# Patient Record
Sex: MALE | Race: WHITE | NOT HISPANIC OR LATINO | URBAN - METROPOLITAN AREA
[De-identification: names, ages, dates, MRNs, and addresses within clinical notes are randomized per-mention and may not be internally consistent; named-entity substitution may affect disease eponyms.]

---

## 2018-03-21 ENCOUNTER — OFFICE VISIT CONVERTED (OUTPATIENT)
Dept: ORTHOPEDIC SURGERY | Facility: CLINIC | Age: 58
End: 2018-03-21
Attending: PHYSICIAN ASSISTANT

## 2018-09-28 ENCOUNTER — OFFICE VISIT CONVERTED (OUTPATIENT)
Dept: FAMILY MEDICINE CLINIC | Facility: CLINIC | Age: 58
End: 2018-09-28
Attending: NURSE PRACTITIONER

## 2018-10-02 ENCOUNTER — CONVERSION ENCOUNTER (OUTPATIENT)
Dept: FAMILY MEDICINE CLINIC | Facility: CLINIC | Age: 58
End: 2018-10-02

## 2018-10-02 ENCOUNTER — OFFICE VISIT CONVERTED (OUTPATIENT)
Dept: FAMILY MEDICINE CLINIC | Facility: CLINIC | Age: 58
End: 2018-10-02
Attending: NURSE PRACTITIONER

## 2019-04-17 ENCOUNTER — OFFICE VISIT CONVERTED (OUTPATIENT)
Dept: FAMILY MEDICINE CLINIC | Facility: CLINIC | Age: 59
End: 2019-04-17
Attending: NURSE PRACTITIONER

## 2019-04-17 ENCOUNTER — HOSPITAL ENCOUNTER (OUTPATIENT)
Dept: GENERAL RADIOLOGY | Facility: HOSPITAL | Age: 59
Discharge: HOME | End: 2019-04-17
Attending: NURSE PRACTITIONER

## 2019-04-17 LAB
BASOPHILS # BLD AUTO: 0.05 10*3/UL (ref 0–0.2)
BASOPHILS NFR BLD AUTO: 0.5 % (ref 0–3)
CONV ABS IMM GRAN: 0.03 10*3/UL (ref 0–0.2)
CONV IMMATURE GRAN: 0.3 % (ref 0–1.8)
DEPRECATED RDW RBC AUTO: 40 FL (ref 35.1–43.9)
EOSINOPHIL # BLD AUTO: 0.08 10*3/UL (ref 0–0.7)
EOSINOPHIL # BLD AUTO: 0.8 % (ref 0–7)
ERYTHROCYTE [DISTWIDTH] IN BLOOD BY AUTOMATED COUNT: 11.9 % (ref 11.6–14.4)
HBA1C MFR BLD: 15.1 G/DL (ref 14–18)
HCT VFR BLD AUTO: 46.1 % (ref 42–52)
LYMPHOCYTES # BLD AUTO: 2.11 10*3/UL (ref 1–5)
MCH RBC QN AUTO: 30 PG (ref 27–31)
MCHC RBC AUTO-ENTMCNC: 32.8 G/DL (ref 33–37)
MCV RBC AUTO: 91.5 FL (ref 80–96)
MONOCYTES # BLD AUTO: 1.18 10*3/UL (ref 0.2–1.2)
MONOCYTES NFR BLD AUTO: 11.9 % (ref 3–10)
NEUTROPHILS # BLD AUTO: 6.49 10*3/UL (ref 2–8)
NEUTROPHILS NFR BLD AUTO: 65.3 % (ref 30–85)
NRBC CBCN: 0 % (ref 0–0.7)
PLATELET # BLD AUTO: 286 10*3/UL (ref 130–400)
PMV BLD AUTO: 11.1 FL (ref 9.4–12.4)
RBC # BLD AUTO: 5.04 10*6/UL (ref 4.7–6.1)
URATE SERPL-MCNC: 7 MG/DL (ref 3.5–8.5)
VARIANT LYMPHS NFR BLD MANUAL: 21.2 % (ref 20–45)
WBC # BLD AUTO: 9.94 10*3/UL (ref 4.8–10.8)

## 2019-12-23 ENCOUNTER — OFFICE VISIT CONVERTED (OUTPATIENT)
Dept: FAMILY MEDICINE CLINIC | Facility: CLINIC | Age: 59
End: 2019-12-23
Attending: NURSE PRACTITIONER

## 2019-12-23 ENCOUNTER — HOSPITAL ENCOUNTER (OUTPATIENT)
Dept: GENERAL RADIOLOGY | Facility: HOSPITAL | Age: 59
Discharge: HOME | End: 2019-12-23
Attending: NURSE PRACTITIONER

## 2020-06-29 ENCOUNTER — OFFICE VISIT CONVERTED (OUTPATIENT)
Dept: FAMILY MEDICINE CLINIC | Facility: CLINIC | Age: 60
End: 2020-06-29
Attending: FAMILY MEDICINE

## 2020-06-29 ENCOUNTER — HOSPITAL ENCOUNTER (OUTPATIENT)
Dept: GENERAL RADIOLOGY | Facility: HOSPITAL | Age: 60
Discharge: HOME | End: 2020-06-29
Attending: FAMILY MEDICINE

## 2020-06-30 LAB
ALBUMIN SERPL-MCNC: 4.4 G/DL (ref 3.5–5)
ALBUMIN/GLOB SERPL: 1.5 {RATIO} (ref 1.4–2.6)
ALP SERPL-CCNC: 5 U/L (ref 56–119)
ALT SERPL-CCNC: 42 U/L (ref 10–40)
ANION GAP SERPL CALC-SCNC: 16 MMOL/L (ref 8–19)
AST SERPL-CCNC: 34 U/L (ref 15–50)
BASOPHILS # BLD AUTO: 0.07 10*3/UL (ref 0–0.2)
BASOPHILS NFR BLD AUTO: 0.8 % (ref 0–3)
BILIRUB SERPL-MCNC: 0.39 MG/DL (ref 0.2–1.3)
BUN SERPL-MCNC: 12 MG/DL (ref 5–25)
BUN/CREAT SERPL: 13 {RATIO} (ref 6–20)
CALCIUM SERPL-MCNC: 9.6 MG/DL (ref 8.7–10.4)
CHLORIDE SERPL-SCNC: 104 MMOL/L (ref 99–111)
CHOLEST SERPL-MCNC: 147 MG/DL (ref 107–200)
CHOLEST/HDLC SERPL: 4.2 {RATIO} (ref 3–6)
CONV ABS IMM GRAN: 0.02 10*3/UL (ref 0–0.2)
CONV CO2: 24 MMOL/L (ref 22–32)
CONV IMMATURE GRAN: 0.2 % (ref 0–1.8)
CONV TOTAL PROTEIN: 7.4 G/DL (ref 6.3–8.2)
CREAT UR-MCNC: 0.96 MG/DL (ref 0.7–1.2)
DEPRECATED RDW RBC AUTO: 39.7 FL (ref 35.1–43.9)
EOSINOPHIL # BLD AUTO: 0.19 10*3/UL (ref 0–0.7)
EOSINOPHIL # BLD AUTO: 2.2 % (ref 0–7)
ERYTHROCYTE [DISTWIDTH] IN BLOOD BY AUTOMATED COUNT: 11.8 % (ref 11.6–14.4)
GFR SERPLBLD BASED ON 1.73 SQ M-ARVRAT: >60 ML/MIN/{1.73_M2}
GLOBULIN UR ELPH-MCNC: 3 G/DL (ref 2–3.5)
GLUCOSE SERPL-MCNC: 115 MG/DL (ref 70–99)
HCT VFR BLD AUTO: 45.5 % (ref 42–52)
HDLC SERPL-MCNC: 35 MG/DL (ref 40–60)
HGB BLD-MCNC: 14.9 G/DL (ref 14–18)
LDLC SERPL CALC-MCNC: 77 MG/DL (ref 70–100)
LYMPHOCYTES # BLD AUTO: 2.72 10*3/UL (ref 1–5)
LYMPHOCYTES NFR BLD AUTO: 31.4 % (ref 20–45)
MCH RBC QN AUTO: 30 PG (ref 27–31)
MCHC RBC AUTO-ENTMCNC: 32.7 G/DL (ref 33–37)
MCV RBC AUTO: 91.5 FL (ref 80–96)
MONOCYTES # BLD AUTO: 1.05 10*3/UL (ref 0.2–1.2)
MONOCYTES NFR BLD AUTO: 12.1 % (ref 3–10)
NEUTROPHILS # BLD AUTO: 4.6 10*3/UL (ref 2–8)
NEUTROPHILS NFR BLD AUTO: 53.3 % (ref 30–85)
NRBC CBCN: 0 % (ref 0–0.7)
OSMOLALITY SERPL CALC.SUM OF ELEC: 291 MOSM/KG (ref 273–304)
PLATELET # BLD AUTO: 265 10*3/UL (ref 130–400)
PMV BLD AUTO: 11 FL (ref 9.4–12.4)
POTASSIUM SERPL-SCNC: 4.3 MMOL/L (ref 3.5–5.3)
RBC # BLD AUTO: 4.97 10*6/UL (ref 4.7–6.1)
SODIUM SERPL-SCNC: 140 MMOL/L (ref 135–147)
TRIGL SERPL-MCNC: 175 MG/DL (ref 40–150)
TSH SERPL-ACNC: 5.23 M[IU]/L (ref 0.27–4.2)
VLDLC SERPL-MCNC: 35 MG/DL (ref 5–37)
WBC # BLD AUTO: 8.65 10*3/UL (ref 4.8–10.8)

## 2020-07-01 LAB
25(OH)D3 SERPL-MCNC: 25.3 NG/ML (ref 30–100)
IRON SATN MFR SERPL: 20 % (ref 20–55)
IRON SERPL-MCNC: 68 UG/DL (ref 70–180)
TIBC SERPL-MCNC: 347 UG/DL (ref 245–450)
TRANSFERRIN SERPL-MCNC: 243 MG/DL (ref 215–365)

## 2021-05-11 ENCOUNTER — CONVERSION ENCOUNTER (OUTPATIENT)
Dept: FAMILY MEDICINE CLINIC | Facility: CLINIC | Age: 61
End: 2021-05-11

## 2021-05-11 ENCOUNTER — OFFICE VISIT CONVERTED (OUTPATIENT)
Dept: FAMILY MEDICINE CLINIC | Facility: CLINIC | Age: 61
End: 2021-05-11
Attending: FAMILY MEDICINE

## 2021-05-13 NOTE — PROGRESS NOTES
Progress Note      Patient Name: Jacky Rodriguez   Patient ID: [id number]   Sex: Male   YOB: 1960    Primary Care Provider: Daniel Faust DO   Referring Provider: Daniel Faust DO    Visit Date: June 29, 2020    Provider: Daniel Faust DO   Location: [facility]   Location Address: [address] [place] [address] [address] [place] [address]  [id number]   Location Phone: [phone]          Chief Complaint  · pt here today complains of right ear pain       History Of Present Illness  Jacky Rodriguez is a 59 year old /White male who presents for evaluation and treatment of:        Patient presents complaining of ear pain wants to lose weight fatigued all the time.  No fever discharge or hearing loss from ear, has tried weight loss diets and medications in the past but no improvement fatigue is all day long wakes up and is wiped out despite doing small activities.  No chest pain palpitations headache or other       Past Medical History  Disease Name Date Onset Notes   Bilateral hip pain 03/22/2016 --    History of bilateral total hip replacement 10/06/2017 --    Hypertension --  --    Osteoarthritis (arthritis due to wear and tear of joints) 03/22/2016 --    Shortness of breath --  with exertion         Past Surgical History  Procedure Name Date Notes   Eye Implant --  yes   Hip Replacement 3-4 years ago 06/29/2020 - Bilateral hip replacement         Medication List  Name Date Started Instructions   Cortisporin ear drops  Instill 5 drops in Right Ear 3 times per day for 7 days   lisinopril-hydrochlorothiazide 20-12.5 mg oral tablet 06/16/2020 1T PO QD BP   Synthroid 50 mcg oral tablet 07/10/2020 take 1 tablet (50 mcg) by oral route once daily for 30 days   Topamax 50 mg oral tablet 06/29/2020 take 1 tablet (50 mg) by oral route daily at bedtime, can increase to 2 times daily for weight loss         Allergy List  Allergen Name Date Reaction Notes   NO KNOWN DRUG ALLERGIES --  --  --           Family Medical History  Disease Name Relative/Age Notes   Brain Neoplasm, Malignant Father/   --    Heart Disease Father/  Mother/   Father; Mother   Congestive Heart Failure Mother/   --    Asthma Mother/   --    *No Known Family History  --          Social History  Finding Status Start/Stop Quantity Notes   Active but no formal exercise --  --/-- --  --    Alcohol Use Current some day --/-- --  occasionally drinks  occasionally drinks  rarely drinks   Denies illicit substance abuse --  --/-- --  --    . --  --/-- --  --    Recreational Drug Use Never --/-- --  no   Tobacco Never --/-- --  never smoker   Uses seatbelts --  --/-- --  --    Working --  --/-- --  --          Immunizations  NameDate Admin Mfg Trade Name Lot Number Route Inj VIS Given VIS Publication   Vksowbhta95/28/2018 [facility] Fluarix, quadrivalent, preservative free [id number] [facility] 09/28/2018 08/05/2015   Comments: Patient tolerated injection well. NDC# 18890-828-79   Flcqxfvts11/29/2017 [facility] Fluarix, quadrivalent, preservative free [id number] [facility] 09/29/2017 08/07/2015   Comments: NDC#[id number]. Patient tolerated well.   Cgmhoanil62/06/2016 [facility] Fluarix, quadrivalent, preservative free [id number] [facility] 10/04/2016 10/04/2016   Comments: NDC# 21890-826-53; patient tolerated well.         Review of Systems  · Constitutional  o Admits  o : fatigue  o Denies  o : fever, weight loss, weight gain  · HENT  o Denies  o : nasal congestion, postnasal drip  · Cardiovascular  o Denies  o : lower extremity edema, claudication, chest pressure, palpitations  · Respiratory  o Denies  o : shortness of breath, wheezing, cough, hemoptysis, dyspnea on exertion  · Gastrointestinal  o Denies  o : nausea, vomiting, diarrhea, constipation, abdominal pain  · Musculoskeletal  o Denies  o : joint pain, joint swelling  · Psychiatric  o Denies  o : anxiety, depression      Vitals  Date Time BP Position Site L\R Cuff Size HR RR TEMP (F) WT  HT  BMI kg/m2 BSA m2 O2 Sat HC   "     06/29/2020 01:16 /93 Sitting    93 - R 16 98.3 243lbs 2oz 5'  10\" 34.88 2.33 96 %          Physical Examination  · Constitutional  o Appearance  o : no acute distress, well-nourished  · Head and Face  o Head  o :   § Inspection  § : atraumatic, normocephalic  · Eyes  o Eyes  o : extraocular movements intact, no scleral icterus, no conjunctival injection  · Ears, Nose, Mouth and Throat  o Ears  o :   § External Ears  § : normal  § Otoscopic Examination  § : tympanic membrane appearance within normal limits bilaterally  o Nose  o :   § Intranasal Exam  § : nares patent  o Oral Cavity  o :   § Oral Mucosa  § : moist mucous membranes  · Respiratory  o Respiratory Effort  o : breathing comfortably, symmetric chest rise  o Auscultation of Lungs  o : clear to asculatation bilaterally, no wheezes, rales, or rhonchii  · Cardiovascular  o Heart  o :   § Auscultation of Heart  § : regular rate and rhythm, no murmurs, rubs, or gallops  o Peripheral Vascular System  o :   § Extremities  § : no edema  · Neurologic  o Mental Status Examination  o :   § Orientation  § : grossly oriented to person, place and time  o Gait and Station  o :   § Gait Screening  § : normal gait  · Psychiatric  o General  o : normal mood and affect          Assessment  · Essential hypertension     401.9/I10  · Fatigue     780.79/R53.83  · GERD (gastroesophageal reflux disease)     530.81/K21.9  · Hyperlipidemia     272.4/E78.5  · Obesity     278.00/E66.9  · Otitis media     382.9/H66.90         Otitis media versus externa  *Drops to treat follow-up with no improvement    Obesity  *Managed  Recommend diet changes calories under 2000 daily  Topamax to treat follow-up monthly for weight loss    Fatigue  *Labs today to work-up  Follow-up if continues to consider depression or other    Follow-up as above for conditions in a month to manage weight loss sooner if concerns     Problems Reconciled  Plan  · Orders  o Physical, Primary Care Panel (CBC, " CMP, Lipid, TSH) [facility] ([id number], 13876, 59778, 54531) - 780.79/R53.83, 530.81/K21.9, 401.9/I10, 272.4/E78.5 - 06/29/2020  o Vitamin D (25-Hydroxy) Level (28469) - 780.79/R53.83, 530.81/K21.9, 401.9/I10, 272.4/E78.5 - 06/29/2020  o ACO-39: Current medications updated and reviewed () - - 06/29/2020  o Iron Profile (Iron, TIBC, and Transferrin) (IRONP) - 780.79/R53.83, 530.81/K21.9, 401.9/I10, 272.4/E78.5 - 06/29/2020  · Medications  o Medications have been Reconciled  o Transition of Care or Provider Policy  · Instructions  o Advised that cheeses and other sources of dairy fats, animal fats, fast food, and the extras (candy, pastries, pies, doughnuts and cookies) all contain LDL raising nutrients. Advised to increase fruits, vegetables, whole grains, and to monitor portion sizes.   o Patient was educated/instructed on their diagnosis, treatment and medications prior to discharge from the clinic today.  o Patient counseled to reduce calorie intake.  o Patient was instructed to exercise regularly.  o Patient instructed to seek medical attention urgently for new or worsening symptoms.  o Risks, benefits, and alternatives were discussed with the patient. The patient is aware of risks associated with:  o Chronic conditions reviewed and taken into consideration for today's treatment plan.  o Electronically Identified Patient Education Materials Provided Electronically  · Disposition  o Call or Return if symptoms worsen or persist.  o Return Visit Request in/on 1 month +/- 2 days ([id number]).            Electronically Signed by: Daniel Faust DO -Author on August 4, 2020 08:28:12 AM

## 2021-05-15 VITALS
HEIGHT: 70 IN | OXYGEN SATURATION: 96 % | RESPIRATION RATE: 16 BRPM | DIASTOLIC BLOOD PRESSURE: 93 MMHG | SYSTOLIC BLOOD PRESSURE: 158 MMHG | TEMPERATURE: 98.3 F | BODY MASS INDEX: 34.81 KG/M2 | HEART RATE: 93 BPM | WEIGHT: 243.12 LBS

## 2021-05-15 VITALS
SYSTOLIC BLOOD PRESSURE: 116 MMHG | HEART RATE: 90 BPM | TEMPERATURE: 97.9 F | WEIGHT: 218.5 LBS | DIASTOLIC BLOOD PRESSURE: 88 MMHG | OXYGEN SATURATION: 98 % | HEIGHT: 70 IN | BODY MASS INDEX: 31.28 KG/M2 | RESPIRATION RATE: 16 BRPM

## 2021-05-15 VITALS
WEIGHT: 233 LBS | HEIGHT: 70 IN | OXYGEN SATURATION: 99 % | DIASTOLIC BLOOD PRESSURE: 95 MMHG | RESPIRATION RATE: 20 BRPM | HEART RATE: 80 BPM | SYSTOLIC BLOOD PRESSURE: 140 MMHG | BODY MASS INDEX: 33.36 KG/M2 | TEMPERATURE: 97.2 F

## 2021-05-16 VITALS
TEMPERATURE: 97.4 F | WEIGHT: 225.25 LBS | SYSTOLIC BLOOD PRESSURE: 127 MMHG | HEART RATE: 83 BPM | HEIGHT: 70 IN | DIASTOLIC BLOOD PRESSURE: 80 MMHG | BODY MASS INDEX: 32.25 KG/M2 | RESPIRATION RATE: 20 BRPM | OXYGEN SATURATION: 96 %

## 2021-05-16 VITALS
WEIGHT: 227.56 LBS | DIASTOLIC BLOOD PRESSURE: 77 MMHG | TEMPERATURE: 97.9 F | RESPIRATION RATE: 20 BRPM | OXYGEN SATURATION: 97 % | HEIGHT: 70 IN | SYSTOLIC BLOOD PRESSURE: 117 MMHG | BODY MASS INDEX: 32.58 KG/M2 | HEART RATE: 72 BPM

## 2021-05-16 VITALS — OXYGEN SATURATION: 96 % | BODY MASS INDEX: 32.23 KG/M2 | HEART RATE: 80 BPM | HEIGHT: 70 IN | WEIGHT: 225.12 LBS

## 2021-06-05 NOTE — PROGRESS NOTES
Progress Note      Patient Name: Jacky Rodriguez   Patient ID: [id number]   Sex: Male   YOB: 1960    Primary Care Provider: Daniel Faust DO   Referring Provider: Daniel Faust DO    Visit Date: May 11, 2021    Provider: Daniel Faust DO   Location: [facility]   Location Address: [address] [place] [address]  [id number]   Location Phone: [phone]          Chief Complaint  · Yearly check up, medication refills       History Of Present Illness  Jacky Rodriguez is a 60 year old /White male who presents for evaluation and treatment of:        Patient presents to follow-up on chronic conditions namely his blood pressure HTN blood pressure today 138/91 he takes lisinopril HCTZ was last seen about a year ago been on this medication and doing well no complaints no chest inhalations headache fever shortness of breath depression anxiety SI HI or other    Counseled on doing a colonoscopy he is actually had 1 before and will get the records from that he said was in [place] review and see if one needs to be repeated       Past Medical History  Disease Name Date Onset Notes   Bilateral hip pain 03/22/2016 --    History of bilateral total hip replacement 10/06/2017 --    Hypertension --  --    Osteoarthritis (arthritis due to wear and tear of joints) 03/22/2016 --    Shortness of breath --  with exertion         Past Surgical History  Procedure Name Date Notes   Eye Implant --  yes   Hip Replacement 3-4 years ago 06/29/2020 - Bilateral hip replacement         Medication List  Name Date Started Instructions   lisinopril-hydrochlorothiazide 20-12.5 mg oral tablet 05/11/2021 take 1 tablet by oral route 2 times daily for blood presure         Allergy List  Allergen Name Date Reaction Notes   NO KNOWN DRUG ALLERGIES --  --  --        Allergies Reconciled  Family Medical History  Disease Name Relative/Age Notes   Brain Neoplasm, Malignant Father/   --    Heart Disease  "Father/  Mother/   Father; Mother   Congestive Heart Failure Mother/   --    Asthma Mother/   --    *No Known Family History  --          Social History  Finding Status Start/Stop Quantity Notes   Active but no formal exercise --  --/-- --  --    Alcohol Use Current some day --/-- --  occasionally drinks  occasionally drinks  rarely drinks   Denies illicit substance abuse --  --/-- --  --    . --  --/-- --  --    Recreational Drug Use Never --/-- --  no   Tobacco Never --/-- --  never smoker   Uses seatbelts --  --/-- --  --    Working --  --/-- --  --          Immunizations  NameDate Admin Mfg Trade Name Lot Number Route Inj VIS Given VIS Publication   Iadbazwny18/28/2018 [facility] Fluarix, quadrivalent, preservative free [id number] [facility] 09/28/2018 08/05/2015   Comments: Patient tolerated injection well. NDC# 59433-480-28         Review of Systems  · Constitutional  o * See HPI  · Eyes  o * See HPI  · HENT  o * See HPI  · Breasts  o * See HPI  · Cardiovascular  o * See HPI  · Respiratory  o * See HPI  · Gastrointestinal  o * See HPI  · Genitourinary  o * See HPI  · Integument  o * See HPI  · Neurologic  o * See HPI  · Musculoskeletal  o * See HPI  · Endocrine  o * See HPI  · Psychiatric  o * See HPI  · Heme-Lymph  o * See HPI  · Allergic-Immunologic  o * See HPI      Vitals  Date Time BP Position Site L\R Cuff Size HR RR TEMP (F) WT  HT  BMI kg/m2 BSA m2 O2 Sat FR L/min FiO2        05/11/2021 03:06 /91 Sitting    94 - R 20 97.5 245lbs 0oz 5'  10\" 35.15 2.34 96 %  21%          Physical Examination  · Constitutional  o Appearance  o : no acute distress, well-nourished  · Head and Face  o Head  o :   § Inspection  § : atraumatic, normocephalic  · Ears, Nose, Mouth and Throat  o Ears  o :   § External Ears  § : normal  o Nose  o :   § Intranasal Exam  § : nares patent  o Oral Cavity  o :   § Oral Mucosa  § : moist mucous membranes  · Respiratory  o Respiratory Effort  o : breathing comfortably, symmetric chest " rise  o Auscultation of Lungs  o : clear to asculatation bilaterally, no wheezes, rales, or rhonchii  · Cardiovascular  o Heart  o :   § Auscultation of Heart  § : regular rate and rhythm, no murmurs, rubs, or gallops  o Peripheral Vascular System  o :   § Extremities  § : no edema  · Neurologic  o Mental Status Examination  o :   § Orientation  § : grossly oriented to person, place and time  o Gait and Station  o :   § Gait Screening  § : normal gait  · Psychiatric  o General  o : normal mood and affect          Assessment  · Hypothyroidism     244.9/E03.9  · Screening for depression     V79.0/Z13.89  · Screening for colon cancer     V76.51/Z12.11  record of cscope in [place] rec getting to review  · HTN (hypertension)     401.9/I10  · HLD (hyperlipidemia)     272.4/E78.5  · Elevated glucose     790.29/R73.09       HTN  *Controlled  Continue with lisinopril HCTZ and monitor blood pressure at home  Goal less than 140/90  If runs above goal at home recommend adjust medicine or take 1-1/2 tablets daily    History of hyperlipidemia, elevated cholesterol  Elevated glucose  We will recommend repeating labs today or before follow-up   Last lipid was 2020 after glycerides were elevated and glucose was over 100 could be prediabetes repeat labs in diet modifications advised    Follow-up annually for physical labs today before follow-up and recommend getting the colonoscopy records to see if he needs to be repeated soon     Problems Reconciled  Plan  · Orders  o ACO-18: Negative screen for clinical depression using a standardized tool () - V79.0/Z13.89 - 05/11/2021  o Hgb A1c [facility] ([id number]) - 790.29/R73.09 - 05/15/2021  o Physical, Primary Care Panel (CBC, CMP, Lipid, TSH) [facility] (59063, 76166, 21172, 68125) - 244.9/E03.9, 401.9/I10, 272.4/E78.5, 790.29/R73.09 - 05/15/2021  o ACO-19: Colorectal cancer screening results documented and reviewed (3017F) - - 05/15/2021   need record from [place]  o ACO-39: Current  medications updated and reviewed (, 1159F) - 244.9/E03.9, 401.9/I10, 272.4/E78.5 - 05/11/2021  · Medications  o Medications have been Reconciled  o Transition of Care or Provider Policy  · Instructions  o Depression Screen completed and scanned into the EMR under the designated folder within the patient's documents.  o Today's PHQ-9 result is 0.  o Handouts were given to patient:   o Patient is taking medications as prescribed and doing well.   o Take all medications as prescribed/directed.  o Patient was educated/instructed on their diagnosis, treatment and medications prior to discharge from the clinic today.  o Patient instructed to seek medical attention urgently for new or worsening symptoms.  o Trusted Web sites were provided.  o Call the office with any concerns or questions.  o Bring all medicines with their bottles to each office visit.  o Risks, benefits, and alternatives were discussed with the patient. The patient is aware of risks associated with:  o Chronic conditions reviewed and taken into consideration for today's treatment plan.  o Electronically Identified Patient Education Materials Provided Electronically  · Disposition  o Call or Return if symptoms worsen or persist.  o Labs before follow up ordered  o Reviewed chart labs and imaging prior to and during encounter, updated  o Return Visit Request in/on 6 months +/- 7 days ([id number]).            Electronically Signed by: Daniel Faust DO - on May 15, 2021 07:32:39 PM

## 2021-07-15 VITALS
HEART RATE: 94 BPM | OXYGEN SATURATION: 96 % | TEMPERATURE: 97.5 F | RESPIRATION RATE: 20 BRPM | WEIGHT: 245 LBS | HEIGHT: 70 IN | DIASTOLIC BLOOD PRESSURE: 91 MMHG | BODY MASS INDEX: 35.07 KG/M2 | SYSTOLIC BLOOD PRESSURE: 138 MMHG

## 2021-08-04 ENCOUNTER — HOSPITAL ENCOUNTER (EMERGENCY)
Facility: HOSPITAL | Age: 61
Discharge: LEFT BEFORE DISCHARGE | End: 2021-08-04

## 2021-08-04 PROCEDURE — 99211 OFF/OP EST MAY X REQ PHY/QHP: CPT

## 2022-02-14 ENCOUNTER — OFFICE VISIT (OUTPATIENT)
Dept: FAMILY MEDICINE CLINIC | Facility: CLINIC | Age: 62
End: 2022-02-14

## 2022-02-14 ENCOUNTER — TELEPHONE (OUTPATIENT)
Dept: FAMILY MEDICINE CLINIC | Facility: CLINIC | Age: 62
End: 2022-02-14

## 2022-02-14 VITALS
HEART RATE: 76 BPM | OXYGEN SATURATION: 95 % | HEIGHT: 70 IN | RESPIRATION RATE: 20 BRPM | TEMPERATURE: 97.8 F | WEIGHT: 245 LBS | BODY MASS INDEX: 35.07 KG/M2 | SYSTOLIC BLOOD PRESSURE: 145 MMHG | DIASTOLIC BLOOD PRESSURE: 90 MMHG

## 2022-02-14 DIAGNOSIS — R05.9 COUGH: ICD-10-CM

## 2022-02-14 DIAGNOSIS — J06.9 UPPER RESPIRATORY TRACT INFECTION, UNSPECIFIED TYPE: Primary | ICD-10-CM

## 2022-02-14 DIAGNOSIS — Z96.653 HISTORY OF TOTAL BILATERAL KNEE REPLACEMENT: Chronic | ICD-10-CM

## 2022-02-14 PROBLEM — I10 HYPERTENSION: Status: ACTIVE | Noted: 2022-02-14

## 2022-02-14 PROBLEM — Z96.659 HISTORY OF TOTAL KNEE REPLACEMENT: Status: ACTIVE | Noted: 2017-10-06

## 2022-02-14 LAB
EXPIRATION DATE: NORMAL
FLUAV AG UPPER RESP QL IA.RAPID: NOT DETECTED
FLUBV AG UPPER RESP QL IA.RAPID: NOT DETECTED
INTERNAL CONTROL: NORMAL
Lab: NORMAL
SARS-COV-2 AG UPPER RESP QL IA.RAPID: NOT DETECTED

## 2022-02-14 PROCEDURE — 87428 SARSCOV & INF VIR A&B AG IA: CPT

## 2022-02-14 PROCEDURE — 99213 OFFICE O/P EST LOW 20 MIN: CPT

## 2022-02-14 RX ORDER — METHYLPREDNISOLONE 4 MG/1
TABLET ORAL
Qty: 1 EACH | Refills: 0 | Status: SHIPPED | OUTPATIENT
Start: 2022-02-14 | End: 2022-05-23

## 2022-02-14 RX ORDER — AZITHROMYCIN 1 G
1 PACKET (EA) ORAL ONCE
Qty: 1 PACKET | Refills: 0 | Status: SHIPPED | OUTPATIENT
Start: 2022-02-14 | End: 2022-02-14

## 2022-02-14 RX ORDER — LISINOPRIL AND HYDROCHLOROTHIAZIDE 20; 12.5 MG/1; MG/1
TABLET ORAL
COMMUNITY
Start: 2021-05-11 | End: 2022-05-20

## 2022-02-14 NOTE — TELEPHONE ENCOUNTER
Caller: [facility] - [place], [place] - [address] [place], [place] [address] DRIVE - [phone]  - [phone] FX    Relationship to patient: Pharmacy    Best call back number: [phone]    Patient is needing: FAY STATED THEY ARE OUT OF THE POWDER FORM OF AZITHROMYCIN AND REQUESTING TO DO 2 TABS TO EQUIL THE 1000 MG

## 2022-05-20 RX ORDER — LISINOPRIL AND HYDROCHLOROTHIAZIDE 20; 12.5 MG/1; MG/1
TABLET ORAL
Qty: 180 TABLET | Refills: 2 | Status: SHIPPED | OUTPATIENT
Start: 2022-05-20 | End: 2022-05-23 | Stop reason: SDUPTHER

## 2022-05-23 ENCOUNTER — LAB (OUTPATIENT)
Dept: LAB | Facility: HOSPITAL | Age: 62
End: 2022-05-23

## 2022-05-23 ENCOUNTER — OFFICE VISIT (OUTPATIENT)
Dept: FAMILY MEDICINE CLINIC | Facility: CLINIC | Age: 62
End: 2022-05-23

## 2022-05-23 VITALS
TEMPERATURE: 98.2 F | DIASTOLIC BLOOD PRESSURE: 82 MMHG | WEIGHT: 230.1 LBS | BODY MASS INDEX: 32.94 KG/M2 | RESPIRATION RATE: 18 BRPM | OXYGEN SATURATION: 97 % | SYSTOLIC BLOOD PRESSURE: 127 MMHG | HEIGHT: 70 IN | HEART RATE: 71 BPM

## 2022-05-23 DIAGNOSIS — E66.09 CLASS 1 OBESITY DUE TO EXCESS CALORIES WITH SERIOUS COMORBIDITY AND BODY MASS INDEX (BMI) OF 33.0 TO 33.9 IN ADULT: Chronic | ICD-10-CM

## 2022-05-23 DIAGNOSIS — I10 PRIMARY HYPERTENSION: Chronic | ICD-10-CM

## 2022-05-23 DIAGNOSIS — N52.2 DRUG-INDUCED ERECTILE DYSFUNCTION: ICD-10-CM

## 2022-05-23 DIAGNOSIS — Z20.822 CLOSE EXPOSURE TO COVID-19 VIRUS: ICD-10-CM

## 2022-05-23 DIAGNOSIS — I10 PRIMARY HYPERTENSION: ICD-10-CM

## 2022-05-23 DIAGNOSIS — M15.9 PRIMARY OSTEOARTHRITIS INVOLVING MULTIPLE JOINTS: ICD-10-CM

## 2022-05-23 DIAGNOSIS — M15.9 PRIMARY OSTEOARTHRITIS INVOLVING MULTIPLE JOINTS: Primary | Chronic | ICD-10-CM

## 2022-05-23 PROBLEM — R06.02 SHORTNESS OF BREATH: Status: ACTIVE | Noted: 2022-05-23

## 2022-05-23 LAB
ALBUMIN SERPL-MCNC: 4.5 G/DL (ref 3.5–5.2)
ALBUMIN/GLOB SERPL: 1.3 G/DL
ALP SERPL-CCNC: 62 U/L (ref 39–117)
ALT SERPL W P-5'-P-CCNC: 38 U/L (ref 1–41)
ANION GAP SERPL CALCULATED.3IONS-SCNC: 12.4 MMOL/L (ref 5–15)
AST SERPL-CCNC: 31 U/L (ref 1–40)
BASOPHILS # BLD AUTO: 0.08 10*3/MM3 (ref 0–0.2)
BASOPHILS NFR BLD AUTO: 1.1 % (ref 0–1.5)
BILIRUB SERPL-MCNC: 0.5 MG/DL (ref 0–1.2)
BUN SERPL-MCNC: 12 MG/DL (ref 8–23)
BUN/CREAT SERPL: 13 (ref 7–25)
CALCIUM SPEC-SCNC: 10 MG/DL (ref 8.6–10.5)
CHLORIDE SERPL-SCNC: 102 MMOL/L (ref 98–107)
CHOLEST SERPL-MCNC: 150 MG/DL (ref 0–200)
CO2 SERPL-SCNC: 23.6 MMOL/L (ref 22–29)
CREAT SERPL-MCNC: 0.92 MG/DL (ref 0.76–1.27)
DEPRECATED RDW RBC AUTO: 39.4 FL (ref 37–54)
EGFRCR SERPLBLD CKD-EPI 2021: 95.2 ML/MIN/1.73
EOSINOPHIL # BLD AUTO: 0.16 10*3/MM3 (ref 0–0.4)
EOSINOPHIL NFR BLD AUTO: 2.3 % (ref 0.3–6.2)
ERYTHROCYTE [DISTWIDTH] IN BLOOD BY AUTOMATED COUNT: 11.6 % (ref 12.3–15.4)
GLOBULIN UR ELPH-MCNC: 3.6 GM/DL
GLUCOSE SERPL-MCNC: 115 MG/DL (ref 65–99)
HCT VFR BLD AUTO: 46.6 % (ref 37.5–51)
HDLC SERPL-MCNC: 42 MG/DL (ref 40–60)
HGB BLD-MCNC: 15.7 G/DL (ref 13–17.7)
IMM GRANULOCYTES # BLD AUTO: 0.02 10*3/MM3 (ref 0–0.05)
IMM GRANULOCYTES NFR BLD AUTO: 0.3 % (ref 0–0.5)
LDLC SERPL CALC-MCNC: 93 MG/DL (ref 0–100)
LDLC/HDLC SERPL: 2.22 {RATIO}
LYMPHOCYTES # BLD AUTO: 2.95 10*3/MM3 (ref 0.7–3.1)
LYMPHOCYTES NFR BLD AUTO: 42.4 % (ref 19.6–45.3)
MCH RBC QN AUTO: 30.6 PG (ref 26.6–33)
MCHC RBC AUTO-ENTMCNC: 33.7 G/DL (ref 31.5–35.7)
MCV RBC AUTO: 90.8 FL (ref 79–97)
MONOCYTES # BLD AUTO: 0.68 10*3/MM3 (ref 0.1–0.9)
MONOCYTES NFR BLD AUTO: 9.8 % (ref 5–12)
NEUTROPHILS NFR BLD AUTO: 3.07 10*3/MM3 (ref 1.7–7)
NEUTROPHILS NFR BLD AUTO: 44.1 % (ref 42.7–76)
NRBC BLD AUTO-RTO: 0 /100 WBC (ref 0–0.2)
PLATELET # BLD AUTO: 277 10*3/MM3 (ref 140–450)
PMV BLD AUTO: 10.8 FL (ref 6–12)
POTASSIUM SERPL-SCNC: 4 MMOL/L (ref 3.5–5.2)
PROT SERPL-MCNC: 8.1 G/DL (ref 6–8.5)
RBC # BLD AUTO: 5.13 10*6/MM3 (ref 4.14–5.8)
SODIUM SERPL-SCNC: 138 MMOL/L (ref 136–145)
T4 FREE SERPL-MCNC: 1.27 NG/DL (ref 0.93–1.7)
TRIGL SERPL-MCNC: 74 MG/DL (ref 0–150)
TSH SERPL DL<=0.05 MIU/L-ACNC: 3.73 UIU/ML (ref 0.27–4.2)
VLDLC SERPL-MCNC: 15 MG/DL (ref 5–40)
WBC NRBC COR # BLD: 6.96 10*3/MM3 (ref 3.4–10.8)

## 2022-05-23 PROCEDURE — 80050 GENERAL HEALTH PANEL: CPT

## 2022-05-23 PROCEDURE — 84439 ASSAY OF FREE THYROXINE: CPT

## 2022-05-23 PROCEDURE — 80061 LIPID PANEL: CPT

## 2022-05-23 PROCEDURE — 99214 OFFICE O/P EST MOD 30 MIN: CPT | Performed by: FAMILY MEDICINE

## 2022-05-23 PROCEDURE — 86769 SARS-COV-2 COVID-19 ANTIBODY: CPT

## 2022-05-23 PROCEDURE — 36415 COLL VENOUS BLD VENIPUNCTURE: CPT

## 2022-05-23 RX ORDER — SILDENAFIL 100 MG/1
100 TABLET, FILM COATED ORAL DAILY PRN
Qty: 10 TABLET | Refills: 11 | Status: SHIPPED | OUTPATIENT
Start: 2022-05-23

## 2022-05-23 RX ORDER — LISINOPRIL AND HYDROCHLOROTHIAZIDE 20; 12.5 MG/1; MG/1
1 TABLET ORAL 2 TIMES DAILY
Qty: 180 TABLET | Refills: 2 | Status: SHIPPED | OUTPATIENT
Start: 2022-05-23

## 2022-05-26 LAB — SARS-COV-2 AB SERPL QL IA: NEGATIVE

## 2022-06-03 PROBLEM — I10 HYPERTENSION: Chronic | Status: ACTIVE | Noted: 2022-02-14

## 2022-06-03 PROBLEM — E66.811 CLASS 1 OBESITY DUE TO EXCESS CALORIES WITH SERIOUS COMORBIDITY AND BODY MASS INDEX (BMI) OF 33.0 TO 33.9 IN ADULT: Status: ACTIVE | Noted: 2022-06-03

## 2022-06-03 PROBLEM — E66.09 CLASS 1 OBESITY DUE TO EXCESS CALORIES WITH SERIOUS COMORBIDITY AND BODY MASS INDEX (BMI) OF 33.0 TO 33.9 IN ADULT: Status: ACTIVE | Noted: 2022-06-03

## 2023-03-02 ENCOUNTER — OFFICE VISIT (OUTPATIENT)
Dept: FAMILY MEDICINE CLINIC | Facility: CLINIC | Age: 63
End: 2023-03-02
Payer: COMMERCIAL

## 2023-03-02 VITALS
SYSTOLIC BLOOD PRESSURE: 118 MMHG | TEMPERATURE: 98 F | HEART RATE: 109 BPM | DIASTOLIC BLOOD PRESSURE: 73 MMHG | HEIGHT: 70 IN | RESPIRATION RATE: 20 BRPM | WEIGHT: 224.6 LBS | OXYGEN SATURATION: 98 % | BODY MASS INDEX: 32.16 KG/M2

## 2023-03-02 DIAGNOSIS — H10.33 ACUTE CONJUNCTIVITIS OF BOTH EYES, UNSPECIFIED ACUTE CONJUNCTIVITIS TYPE: ICD-10-CM

## 2023-03-02 DIAGNOSIS — J06.9 UPPER RESPIRATORY TRACT INFECTION, UNSPECIFIED TYPE: Primary | ICD-10-CM

## 2023-03-02 DIAGNOSIS — R05.1 ACUTE COUGH: ICD-10-CM

## 2023-03-02 DIAGNOSIS — R09.81 NASAL CONGESTION: ICD-10-CM

## 2023-03-02 DIAGNOSIS — J02.9 SORE THROAT: ICD-10-CM

## 2023-03-02 LAB
EXPIRATION DATE: NORMAL
FLUAV AG NPH QL: NEGATIVE
FLUBV AG NPH QL: NEGATIVE
INTERNAL CONTROL: NORMAL
Lab: NORMAL
S PYO AG THROAT QL: NEGATIVE
SARS-COV-2 AG UPPER RESP QL IA.RAPID: NOT DETECTED

## 2023-03-02 PROCEDURE — 99214 OFFICE O/P EST MOD 30 MIN: CPT

## 2023-03-02 PROCEDURE — 87804 INFLUENZA ASSAY W/OPTIC: CPT

## 2023-03-02 PROCEDURE — 87426 SARSCOV CORONAVIRUS AG IA: CPT

## 2023-03-02 PROCEDURE — 87880 STREP A ASSAY W/OPTIC: CPT

## 2023-03-02 RX ORDER — POLYMYXIN B SULFATE AND TRIMETHOPRIM 1; 10000 MG/ML; [USP'U]/ML
1 SOLUTION OPHTHALMIC EVERY 4 HOURS
Qty: 10 ML | Refills: 0 | Status: SHIPPED | OUTPATIENT
Start: 2023-03-02 | End: 2023-03-09

## 2023-03-02 RX ORDER — CEFDINIR 300 MG/1
300 CAPSULE ORAL 2 TIMES DAILY
Qty: 14 CAPSULE | Refills: 0 | Status: SHIPPED | OUTPATIENT
Start: 2023-03-02 | End: 2023-03-09

## 2023-03-02 RX ORDER — BROMPHENIRAMINE MALEATE, PSEUDOEPHEDRINE HYDROCHLORIDE, AND DEXTROMETHORPHAN HYDROBROMIDE 2; 30; 10 MG/5ML; MG/5ML; MG/5ML
5 SYRUP ORAL 4 TIMES DAILY PRN
Qty: 118 ML | Refills: 0 | Status: SHIPPED | OUTPATIENT
Start: 2023-03-02

## 2023-03-02 NOTE — PROGRESS NOTES
"Chief Complaint   Patient presents with   • Sore Throat     Symptoms for three days now.    • Cough     Coughing up  a lot of drainage.    • Nasal Congestion   • Eye Drainage     Has irritated and getting crusty, grandson had pink eye.        Subjective          Jacky Rodriguez presents to [facility]    History of Present Illness  He is here to be seen for sore throat, cough, nasal congestion, and eye drainage. He has had these symptoms for 3 days now. The eye drainage started yesterday. His grandson did have pink eye and he believes he could have gotten it from him. He was tested for strep, covid, and flu and was negative for all three.       Past History:  Medical History: has no past medical history on file.   Surgical History: has no past surgical history on file.   Family History: family history is not on file.   Social History: reports that he has never smoked. He has never been exposed to tobacco smoke. He has never used smokeless tobacco. He reports that he does not drink alcohol and does not use drugs.  Allergies: Patient has no known allergies.  (Not in a hospital admission)       Social History     Socioeconomic History   • Marital status:    Tobacco Use   • Smoking status: Never     Passive exposure: Never   • Smokeless tobacco: Never   Substance and Sexual Activity   • Alcohol use: Never   • Drug use: Never       There are no preventive care reminders to display for this patient.    Objective     Vital Signs:   /73 (BP Location: Left arm, Patient Position: Sitting)   Pulse 109   Temp 98 °F (36.7 °C) (Infrared)   Resp 20   Ht 177.8 cm (70\")   Wt 102 kg (224 lb 9.6 oz)   SpO2 98%   BMI 32.23 kg/m²       Physical Exam  Constitutional:       Appearance: Normal appearance.   HENT:      Nose: Nose normal.      Mouth/Throat:      Mouth: Mucous membranes are moist.   Eyes:      General:         Right eye: Discharge present.         Left eye: Discharge " present.  Cardiovascular:      Rate and Rhythm: Normal rate and regular rhythm.      Pulses: Normal pulses.      Heart sounds: Normal heart sounds.   Pulmonary:      Effort: Pulmonary effort is normal.      Breath sounds: Normal breath sounds.   Skin:     General: Skin is warm and dry.   Neurological:      General: No focal deficit present.      Mental Status: He is alert and oriented to person, place, and time.   Psychiatric:         Mood and Affect: Mood normal.         Behavior: Behavior normal.          Review of Systems   HENT: Positive for congestion.    Respiratory: Positive for cough.    All other systems reviewed and are negative.       Result Review :                 Assessment and Plan    Diagnoses and all orders for this visit:    1. Upper respiratory tract infection, unspecified type (Primary)  -     cefdinir (OMNICEF) 300 MG capsule; Take 1 capsule by mouth 2 (Two) Times a Day for 7 days.  Dispense: 14 capsule; Refill: 0    2. Acute conjunctivitis of both eyes, unspecified acute conjunctivitis type  -     trimethoprim-polymyxin b (POLYTRIM) 43183-6.1 UNIT/ML-% ophthalmic solution; Administer 1 drop to both eyes Every 4 (Four) Hours for 7 days.  Dispense: 10 mL; Refill: 0    3. Nasal congestion  -     POCT SARS-CoV-2 Antigen ERI  -     POCT Influenza A/B    4. Acute cough  -     POCT SARS-CoV-2 Antigen ERI  -     POCT Influenza A/B  -     brompheniramine-pseudoephedrine-DM 30-2-10 MG/5ML syrup; Take 5 mL by mouth 4 (Four) Times a Day As Needed for Cough or Allergies.  Dispense: 118 mL; Refill: 0    5. Sore throat  -     POCT rapid strep A      I spent 35 minutes caring for Jacky on this date of service. This time includes time spent by me in the following activities:preparing for the visit, reviewing tests, obtaining and/or reviewing a separately obtained history, performing a medically appropriate examination and/or evaluation , counseling and educating the patient/family/caregiver, ordering  medications, tests, or procedures and documenting information in the medical record    Pt thought to be clinically stable at this time.    Follow Up   No follow-ups on file.  Patient was given instructions and counseling regarding his condition or for health maintenance advice. Please see specific information pulled into the AVS if appropriate.

## 2023-03-06 ENCOUNTER — TELEPHONE (OUTPATIENT)
Dept: FAMILY MEDICINE CLINIC | Facility: CLINIC | Age: 63
End: 2023-03-06
Payer: COMMERCIAL

## 2023-03-07 RX ORDER — PREDNISONE 20 MG/1
TABLET ORAL
Qty: 18 TABLET | Refills: 0 | Status: SHIPPED | OUTPATIENT
Start: 2023-03-07 | End: 2023-03-16

## 2023-03-07 NOTE — TELEPHONE ENCOUNTER
Spoke with patient, swelling in hand only, was feeling hot when it first started but now just swollen and painful. Patient stated started on Friday.    Per Dr. Faust, send in taper dose of prednisone,60mg 3days, 40mg 3 days, 20mg 3 days.    Let patient know medication was being sent, if he did not notice a change after 2 days to call office back

## 2023-07-28 ENCOUNTER — TELEPHONE (OUTPATIENT)
Dept: FAMILY MEDICINE CLINIC | Facility: CLINIC | Age: 63
End: 2023-07-28
Payer: COMMERCIAL

## 2023-10-03 ENCOUNTER — OFFICE VISIT (OUTPATIENT)
Dept: FAMILY MEDICINE CLINIC | Facility: CLINIC | Age: 63
End: 2023-10-03
Payer: COMMERCIAL

## 2023-10-03 VITALS
SYSTOLIC BLOOD PRESSURE: 150 MMHG | HEIGHT: 70 IN | OXYGEN SATURATION: 97 % | BODY MASS INDEX: 32.48 KG/M2 | DIASTOLIC BLOOD PRESSURE: 88 MMHG | HEART RATE: 101 BPM | RESPIRATION RATE: 16 BRPM | TEMPERATURE: 98.7 F | WEIGHT: 226.9 LBS

## 2023-10-03 DIAGNOSIS — I10 HYPERTENSION, UNSPECIFIED TYPE: Primary | ICD-10-CM

## 2023-10-03 DIAGNOSIS — R00.0 TACHYCARDIA: ICD-10-CM

## 2023-10-03 PROCEDURE — 99213 OFFICE O/P EST LOW 20 MIN: CPT | Performed by: FAMILY MEDICINE

## 2023-10-03 RX ORDER — METOPROLOL SUCCINATE 25 MG/1
25 TABLET, EXTENDED RELEASE ORAL DAILY
Qty: 30 TABLET | Refills: 2 | Status: SHIPPED | OUTPATIENT
Start: 2023-10-03

## 2023-10-03 NOTE — PROGRESS NOTES
"Chief Complaint  Syncope and Shortness of Breath    Subjective        Jacky Rodriguez presents to [facility]  History of Present Illness    Patient presents with syncopal type episode shortness of breath patient says he has been working in the cold and recently has episodes where he feels like he is overheating perhaps blood pressure medicine he takes daily has been well controlled mildly elevated today and heart rate elevated shortness of breath improved with deep breathing    symptoms get worse since receiving RSV vaccine    Objective   Vital Signs:  /88   Pulse 101   Temp 98.7 °F (37.1 °C)   Resp 16   Ht 177.8 cm (70\")   Wt 103 kg (226 lb 14.4 oz)   SpO2 97%   BMI 32.56 kg/m²   Estimated body mass index is 32.56 kg/m² as calculated from the following:    Height as of this encounter: 177.8 cm (70\").    Weight as of this encounter: 103 kg (226 lb 14.4 oz).               Physical Exam  Vitals reviewed.   Constitutional:       Appearance: Normal appearance. He is well-developed.   HENT:      Head: Normocephalic and atraumatic.      Right Ear: External ear normal.      Left Ear: External ear normal.      Nose: Nose normal.   Eyes:      Conjunctiva/sclera: Conjunctivae normal.      Pupils: Pupils are equal, round, and reactive to light.   Cardiovascular:      Rate and Rhythm: Normal rate.   Pulmonary:      Effort: Pulmonary effort is normal.      Breath sounds: Normal breath sounds.   Abdominal:      General: There is no distension.   Skin:     General: Skin is warm and dry.   Neurological:      Mental Status: He is alert and oriented to person, place, and time. Mental status is at baseline.   Psychiatric:         Mood and Affect: Mood and affect normal.         Behavior: Behavior normal.         Thought Content: Thought content normal.         Judgment: Judgment normal.      Result Review :  The following data was reviewed by: Daniel Faust DO on 10/03/2023:  Common labs  "         7/19/2023    08:06   Common Labs   Glucose 110    BUN 12    Creatinine 1.02    Sodium 140    Potassium 4.0    Chloride 102    Calcium 9.8    Albumin 4.3    Total Bilirubin 0.4    Alkaline Phosphatase 66    AST (SGOT) 35    ALT (SGPT) 33    WBC 8.77    Hemoglobin 15.1    Hematocrit 44.1    Platelets 352    Total Cholesterol 141    Triglycerides 56    HDL Cholesterol 36    LDL Cholesterol  93    PSA 1.710                   Assessment and Plan   Diagnoses and all orders for this visit:    1. Hypertension, unspecified type (Primary)    2. Tachycardia    Other orders  -     metoprolol succinate XL (Toprol XL) 25 MG 24 hr tablet; Take 1 tablet by mouth Daily.  Dispense: 30 tablet; Refill: 2      Blood pressure elevated slightly today tachycardic recommended continuing blood pressure medicine starting metoprolol monitor blood pressure at home goal less than 140/90 following up in a couple weeks and consider further work-up imaging if needed if symptoms continue or worsen         Follow Up   Return in about 2 weeks (around 10/17/2023) for Recheck.  Patient was given instructions and counseling regarding his condition or for health maintenance advice. Please see specific information pulled into the AVS if appropriate.

## 2023-10-09 ENCOUNTER — APPOINTMENT (OUTPATIENT)
Dept: CT IMAGING | Facility: HOSPITAL | Age: 63
End: 2023-10-09
Payer: COMMERCIAL

## 2023-10-09 ENCOUNTER — HOSPITAL ENCOUNTER (EMERGENCY)
Facility: HOSPITAL | Age: 63
Discharge: HOME | End: 2023-10-09
Attending: EMERGENCY MEDICINE | Admitting: EMERGENCY MEDICINE
Payer: COMMERCIAL

## 2023-10-09 VITALS
HEART RATE: 90 BPM | RESPIRATION RATE: 16 BRPM | SYSTOLIC BLOOD PRESSURE: 121 MMHG | BODY MASS INDEX: 33.47 KG/M2 | TEMPERATURE: 98.1 F | HEIGHT: 69 IN | DIASTOLIC BLOOD PRESSURE: 88 MMHG | OXYGEN SATURATION: 94 % | WEIGHT: 225.97 LBS

## 2023-10-09 DIAGNOSIS — S06.0X1A CONCUSSION WITH LOSS OF CONSCIOUSNESS OF 30 MINUTES OR LESS, INITIAL ENCOUNTER: Primary | ICD-10-CM

## 2023-10-09 LAB
ALBUMIN SERPL-MCNC: 4.3 G/DL (ref 3.5–5.2)
ALBUMIN/GLOB SERPL: 1 G/DL
ALP SERPL-CCNC: 72 U/L (ref 39–117)
ALT SERPL W P-5'-P-CCNC: 26 U/L (ref 1–41)
ANION GAP SERPL CALCULATED.3IONS-SCNC: 11.5 MMOL/L (ref 5–15)
AST SERPL-CCNC: 28 U/L (ref 1–40)
BASOPHILS # BLD AUTO: 0.1 10*3/MM3 (ref 0–0.2)
BASOPHILS NFR BLD AUTO: 0.8 % (ref 0–1.5)
BILIRUB SERPL-MCNC: 0.5 MG/DL (ref 0–1.2)
BUN SERPL-MCNC: 19 MG/DL (ref 8–23)
BUN/CREAT SERPL: 17.4 (ref 7–25)
CALCIUM SPEC-SCNC: 10.1 MG/DL (ref 8.6–10.5)
CHLORIDE SERPL-SCNC: 96 MMOL/L (ref 98–107)
CO2 SERPL-SCNC: 28.5 MMOL/L (ref 22–29)
CREAT SERPL-MCNC: 1.09 MG/DL (ref 0.76–1.27)
DEPRECATED RDW RBC AUTO: 40.5 FL (ref 37–54)
EGFRCR SERPLBLD CKD-EPI 2021: 77.2 ML/MIN/1.73
EOSINOPHIL # BLD AUTO: 0.27 10*3/MM3 (ref 0–0.4)
EOSINOPHIL NFR BLD AUTO: 2.3 % (ref 0.3–6.2)
ERYTHROCYTE [DISTWIDTH] IN BLOOD BY AUTOMATED COUNT: 12 % (ref 12.3–15.4)
GLOBULIN UR ELPH-MCNC: 4.1 GM/DL
GLUCOSE SERPL-MCNC: 101 MG/DL (ref 65–99)
HCT VFR BLD AUTO: 49.1 % (ref 37.5–51)
HGB BLD-MCNC: 16.2 G/DL (ref 13–17.7)
HOLD SPECIMEN: NORMAL
HOLD SPECIMEN: NORMAL
IMM GRANULOCYTES # BLD AUTO: 0.05 10*3/MM3 (ref 0–0.05)
IMM GRANULOCYTES NFR BLD AUTO: 0.4 % (ref 0–0.5)
LYMPHOCYTES # BLD AUTO: 3.82 10*3/MM3 (ref 0.7–3.1)
LYMPHOCYTES NFR BLD AUTO: 32.4 % (ref 19.6–45.3)
MAGNESIUM SERPL-MCNC: 2.4 MG/DL (ref 1.6–2.4)
MCH RBC QN AUTO: 30.1 PG (ref 26.6–33)
MCHC RBC AUTO-ENTMCNC: 33 G/DL (ref 31.5–35.7)
MCV RBC AUTO: 91.1 FL (ref 79–97)
MONOCYTES # BLD AUTO: 1.21 10*3/MM3 (ref 0.1–0.9)
MONOCYTES NFR BLD AUTO: 10.3 % (ref 5–12)
NEUTROPHILS NFR BLD AUTO: 53.8 % (ref 42.7–76)
NEUTROPHILS NFR BLD AUTO: 6.33 10*3/MM3 (ref 1.7–7)
NRBC BLD AUTO-RTO: 0 /100 WBC (ref 0–0.2)
PLATELET # BLD AUTO: 332 10*3/MM3 (ref 140–450)
PMV BLD AUTO: 10.4 FL (ref 6–12)
POTASSIUM SERPL-SCNC: 4.1 MMOL/L (ref 3.5–5.2)
PROT SERPL-MCNC: 8.4 G/DL (ref 6–8.5)
RBC # BLD AUTO: 5.39 10*6/MM3 (ref 4.14–5.8)
SODIUM SERPL-SCNC: 136 MMOL/L (ref 136–145)
TROPONIN T SERPL HS-MCNC: 10 NG/L
WBC NRBC COR # BLD: 11.78 10*3/MM3 (ref 3.4–10.8)
WHOLE BLOOD HOLD COAG: NORMAL
WHOLE BLOOD HOLD SPECIMEN: NORMAL

## 2023-10-09 PROCEDURE — 84484 ASSAY OF TROPONIN QUANT: CPT | Performed by: EMERGENCY MEDICINE

## 2023-10-09 PROCEDURE — 93005 ELECTROCARDIOGRAM TRACING: CPT

## 2023-10-09 PROCEDURE — 80053 COMPREHEN METABOLIC PANEL: CPT | Performed by: EMERGENCY MEDICINE

## 2023-10-09 PROCEDURE — 85025 COMPLETE CBC W/AUTO DIFF WBC: CPT | Performed by: EMERGENCY MEDICINE

## 2023-10-09 PROCEDURE — 99284 EMERGENCY DEPT VISIT MOD MDM: CPT

## 2023-10-09 PROCEDURE — 70450 CT HEAD/BRAIN W/O DYE: CPT

## 2023-10-09 PROCEDURE — 93005 ELECTROCARDIOGRAM TRACING: CPT | Performed by: EMERGENCY MEDICINE

## 2023-10-09 PROCEDURE — 83735 ASSAY OF MAGNESIUM: CPT | Performed by: EMERGENCY MEDICINE

## 2023-10-09 RX ORDER — SODIUM CHLORIDE 0.9 % (FLUSH) 0.9 %
10 SYRINGE (ML) INJECTION AS NEEDED
Status: DISCONTINUED | OUTPATIENT
Start: 2023-10-09 | End: 2023-10-09 | Stop reason: HOSPADM

## 2023-10-09 NOTE — Clinical Note
[facility] EMERGENCY ROOM  [address]  [place] [address]  Phone: [phone]    Jacky Rodriguez was seen and treated in our emergency department on 10/9/2023.  He may return to work on 10/16/2023.         Thank you for choosing [facility].    Rubens Casillas MD

## 2023-10-09 NOTE — ED PROVIDER NOTES
Time: 5:00 PM EDT  Date of encounter:  10/9/2023  Independent Historian/Clinical History and Information was obtained by:   Patient and Family    History is limited by: N/A    Chief Complaint   Patient presents with    Headache    Syncope         History of Present Illness:  Patient is a 62 y.o. year old male who presents to the emergency department for evaluation of headache and syncope.  Patient's family states the patient had RSV shot approximately 1 week ago and had a syncopal episode on 10-2-2023.  Patient states since that time he has had severe headache with intermittent dizziness.  Patient has also been confused and forgetful since that time.  (Provider in triage, Kendell Berger PA-C)    Patient Care Team  Primary Care Provider: Daniel Faust DO    Past Medical History:     Allergies   Allergen Reactions    Cefdinir Swelling     History reviewed. No pertinent past medical history.  Past Surgical History:   Procedure Laterality Date    PARTIAL HIP ARTHROPLASTY Bilateral      History reviewed. No pertinent family history.    Home Medications:  Prior to Admission medications    Medication Sig Start Date End Date Taking? Authorizing Provider   lisinopril-hydrochlorothiazide (PRINZIDE,ZESTORETIC) 20-12.5 MG per tablet Take 1 tablet by mouth Daily. 7/19/23   Daniel Faust DO   metoprolol succinate XL (Toprol XL) 25 MG 24 hr tablet Take 1 tablet by mouth Daily. 10/3/23   Daniel Faust DO   sildenafil (Viagra) 100 MG tablet Take 1 tablet by mouth Daily As Needed for Erectile Dysfunction. 7/19/23   Daniel Faust DO        Social History:   Social History     Tobacco Use    Smoking status: Never     Passive exposure: Never    Smokeless tobacco: Never   Vaping Use    Vaping Use: Never used   Substance Use Topics    Alcohol use: Not Currently     Comment: occ    Drug use: Never         Review of Systems:  Review of Systems   Constitutional:  Negative for chills and fever.   HENT:  Negative for congestion, rhinorrhea  "and sore throat.    Eyes:  Negative for pain and visual disturbance.   Respiratory:  Negative for apnea, cough, chest tightness and shortness of breath.    Cardiovascular:  Negative for chest pain and palpitations.   Gastrointestinal:  Negative for abdominal pain, diarrhea, nausea and vomiting.   Genitourinary:  Negative for difficulty urinating and dysuria.   Musculoskeletal:  Negative for joint swelling and myalgias.   Skin:  Negative for color change.   Neurological:  Positive for dizziness, syncope and headaches. Negative for seizures.   Psychiatric/Behavioral: Negative.     All other systems reviewed and are negative.       Physical Exam:  /88   Pulse 90   Temp 98.1 øF (36.7 øC) (Oral)   Resp 16   Ht 175.3 cm (69\")   Wt 102 kg (225 lb 15.5 oz)   SpO2 94%   BMI 33.37 kg/mý         Physical Exam  Vitals and nursing note reviewed.   Constitutional:       General: He is not in acute distress.     Appearance: Normal appearance. He is not toxic-appearing.   HENT:      Head: Normocephalic and atraumatic.      Jaw: There is normal jaw occlusion.   Eyes:      General: Lids are normal.      Extraocular Movements: Extraocular movements intact.      Conjunctiva/sclera: Conjunctivae normal.      Pupils: Pupils are equal, round, and reactive to light.      Comments: No nystagmus   Cardiovascular:      Rate and Rhythm: Normal rate and regular rhythm.      Pulses: Normal pulses.      Heart sounds: Normal heart sounds.   Pulmonary:      Effort: Pulmonary effort is normal. No respiratory distress.      Breath sounds: Normal breath sounds. No wheezing or rhonchi.   Abdominal:      General: Abdomen is flat. There is no distension.      Palpations: Abdomen is soft.      Tenderness: There is no abdominal tenderness. There is no guarding or rebound.   Musculoskeletal:         General: Normal range of motion.      Cervical back: Normal range of motion and neck supple.      Right lower leg: No edema.      Left lower leg: " No edema.   Skin:     General: Skin is warm and dry.      Coloration: Skin is not cyanotic.   Neurological:      Mental Status: He is alert and oriented to person, place, and time. Mental status is at baseline.   Psychiatric:         Attention and Perception: Attention and perception normal.         Mood and Affect: Mood normal.                      Procedures:  Procedures      Medical Decision Making:      Comorbidities that affect care:    Hypertension    External Notes reviewed:    Previous Clinic Note: Family medicine office visit for general medical management      The following orders were placed and all results were independently analyzed by me:  Orders Placed This Encounter   Procedures    CT Head Without Contrast    [place] Draw    Comprehensive Metabolic Panel    Magnesium    Single High Sensitivity Troponin T    CBC Auto Differential    NPO Diet NPO Type: Strict NPO    Undress & Gown    Continuous Pulse Oximetry    Vital Signs    Orthostatic Blood Pressure    Oxygen Therapy- Nasal Cannula; Titrate 1-6 LPM Per SpO2; 90 - 95%    POC Glucose Once    ECG 12 Lead ED Triage Standing Order; Syncope    Insert Peripheral IV    CBC & Differential    Green Top (Gel)    Lavender Top    Gold Top - SST    Light Blue Top       Medications Given in the Emergency Department:  Medications   sodium chloride 0.9 % flush 10 mL (has no administration in time range)        ED Course:    The patient was initially evaluated in the triage area where orders were placed. The patient was later dispositioned by Rubens Casillas MD.      The patient was advised to stay for completion of workup which includes but is not limited to communication of labs and radiological results, reassessment and plan. The patient was advised that leaving prior to disposition by a provider could result in critical findings that are not communicated to the patient.     ED Course as of 10/09/23 1958   Mon Oct 09, 2023   1701 PROVIDER IN TRIAGE  Patient was  evaluated by me in triage, Kendell Berger PA-C.  Orders were placed and patient is currently awaiting final results and disposition.  [MD]      ED Course User Index  [MD] Kendell Berger PA-C       Labs:    Lab Results (last 24 hours)       Procedure Component Value Units Date/Time    CBC & Differential [[id number]]  (Abnormal) Collected: 10/09/23 1848    Specimen: Blood Updated: 10/09/23 1856    Narrative:      The following orders were created for panel order CBC & Differential.  Procedure                               Abnormality         Status                     ---------                               -----------         ------                     CBC Auto Differential[[id number]]        Abnormal            Final result                 Please view results for these tests on the individual orders.    Comprehensive Metabolic Panel [[id number]]  (Abnormal) Collected: 10/09/23 1848    Specimen: Blood Updated: 10/09/23 1914     Glucose 101 mg/dL      BUN 19 mg/dL      Creatinine 1.09 mg/dL      Sodium 136 mmol/L      Potassium 4.1 mmol/L      Chloride 96 mmol/L      CO2 28.5 mmol/L      Calcium 10.1 mg/dL      Total Protein 8.4 g/dL      Albumin 4.3 g/dL      ALT (SGPT) 26 U/L      AST (SGOT) 28 U/L      Alkaline Phosphatase 72 U/L      Total Bilirubin 0.5 mg/dL      Globulin 4.1 gm/dL      A/G Ratio 1.0 g/dL      BUN/Creatinine Ratio 17.4     Anion Gap 11.5 mmol/L      eGFR 77.2 mL/min/1.73     Narrative:      GFR Normal >60  Chronic Kidney Disease <60  Kidney Failure <15      Magnesium [[id number]]  (Normal) Collected: 10/09/23 1848    Specimen: Blood Updated: 10/09/23 1914     Magnesium 2.4 mg/dL     Single High Sensitivity Troponin T [[id number]]  (Normal) Collected: 10/09/23 1848    Specimen: Blood Updated: 10/09/23 1914     HS Troponin T 10 ng/L     Narrative:      High Sensitive Troponin T Reference Range:  <10.0 ng/L- Negative Female for AMI  <15.0 ng/L- Negative Male for AMI  >=10 - Abnormal Female  indicating possible myocardial injury.  >=15 - Abnormal Male indicating possible myocardial injury.   Clinicians would have to utilize clinical acumen, EKG, Troponin, and serial changes to determine if it is an Acute Myocardial Infarction or myocardial injury due to an underlying chronic condition.         CBC Auto Differential [[id number]]  (Abnormal) Collected: 10/09/23 1848    Specimen: Blood Updated: 10/09/23 1856     WBC 11.78 10*3/mm3      RBC 5.39 10*6/mm3      Hemoglobin 16.2 g/dL      Hematocrit 49.1 %      MCV 91.1 fL      MCH 30.1 pg      MCHC 33.0 g/dL      RDW 12.0 %      RDW-SD 40.5 fl      MPV 10.4 fL      Platelets 332 10*3/mm3      Neutrophil % 53.8 %      Lymphocyte % 32.4 %      Monocyte % 10.3 %      Eosinophil % 2.3 %      Basophil % 0.8 %      Immature Grans % 0.4 %      Neutrophils, Absolute 6.33 10*3/mm3      Lymphocytes, Absolute 3.82 10*3/mm3      Monocytes, Absolute 1.21 10*3/mm3      Eosinophils, Absolute 0.27 10*3/mm3      Basophils, Absolute 0.10 10*3/mm3      Immature Grans, Absolute 0.05 10*3/mm3      nRBC 0.0 /100 WBC              Imaging:    CT Head Without Contrast    Result Date: 10/9/2023  PROCEDURE: CT HEAD WO CONTRAST  COMPARISON:  None INDICATIONS: Dizziness, syncope with head injury 3 days ago  PROTOCOL:   Standard imaging protocol performed    RADIATION:   DLP: 1082.2mGy*cm   MA and/or KV was adjusted to minimize radiation dose.     TECHNIQUE: After obtaining the patient's consent, CT images were obtained without non-ionic intravenous contrast material.  FINDINGS:  No focal brain lesion, intracranial mass or intracranial hemorrhage is seen.  The ventricles are normal in size and configuration.  In the left temporal region is a 1.9 cm fat containing intramuscular mass consistent with a lipoma.  Moderate mucosal inflammatory changes are noted in the ethmoid sinuses bilaterally.        1. No acute fracture or intracranial hemorrhage 2. 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     Jose Torres M.D.       Electronically Signed and Approved By: Jose Torres M.D. on 10/09/2023 at 18:53                Differential Diagnosis and Discussion:      Hematuria: Differential diagnosis includes but is not limited to medications, coagulopathy, glomerulonephritis, nephritis, neoplasm, vascular abnormalities, cystitis, urethritis, neoplasms of the bladder, and autoimmune disorders.  Syncope: Differential diagnosis includes but is not limited to TIA, hyperventilation, aortic stenosis, pulmonary emboli, myocardial disease, bradycardia arrhythmia, heart block, tachyarrhythmia, vasovagal, orthostatic hypotension, ruptured AAA, aortic dissection, subarachnoid hemorrhage, seizure, hypoglycemia.    All labs were reviewed and interpreted by me.  CT scan radiology impression was interpreted by me.    MDM  Number of Diagnoses or Management Options  Concussion with loss of consciousness of 30 minutes or less, initial encounter  Diagnosis management comments: In summary this is a 62-year-old male patient who presents to the emerged department for evaluation.  He reports while outside working the other day he fell out hitting his head on concrete.  He reports since that time has had headaches just generally not feeling well.  CT the brain today is unremarkable for acute pathology.  CBC independently reviewed by me and shows no critical abnormalities.  CMP independently reviewed by me and shows no critical abnormalities.  Patient is otherwise well-appearing in no acute distress.  I do believe he has postconcussive syndrome at this time.  Very strict return to ER and follow-up instructions have been provided to the patient.         Amount and/or Complexity of Data Reviewed  Clinical lab tests: reviewed  Tests in the radiology section of CP[facility]: reviewed  Tests in the medicine section of [facility]: reviewed             Patient Care Considerations:    MRI: I considered ordering an MRI however no focal  neurologic deficit and CT scan of the brain unremarkable      Consultants/Shared Management Plan:    None    Social Determinants of Health:    Patient is independent, reliable, and has access to care.       Disposition and Care Coordination:    Discharged: The patient is suitable and stable for discharge with no need for consideration of observation or admission.    I have explained the patient's condition, diagnoses and treatment plan based on the information available to me at this time. I have answered questions and addressed any concerns. The patient has a good  understanding of the patient's diagnosis, condition, and treatment plan as can be expected at this point. The vital signs have been stable. The patient's condition is stable and appropriate for discharge from the emergency department.      The patient will pursue further outpatient evaluation with the primary care physician or other designated or consulting physician as outlined in the discharge instructions. They are agreeable to this plan of care and follow-up instructions have been explained in detail. The patient has received these instructions in written format and have expressed an understanding of the discharge instructions. The patient is aware that any significant change in condition or worsening of symptoms should prompt an immediate return to this or the closest emergency department or call to 911.  I have explained discharge medications and the need for follow up with the patient/caretakers. This was also printed in the discharge instructions. Patient was discharged with the following medications and follow up:      Medication List      No changes were made to your prescriptions during this visit.      Daniel Faust  [address]  [address] [address] [phone]    In 1 week         Final diagnoses:   Concussion with loss of consciousness of 30 minutes or less, initial encounter        ED Disposition       ED Disposition    Discharge    Condition   Stable    Comment   --               This medical record created using voice recognition software.             Rubens Casillas MD  10/09/23 1958

## 2023-10-10 LAB
QT INTERVAL: 352 MS
QTC INTERVAL: 440 MS

## 2023-10-17 ENCOUNTER — OFFICE VISIT (OUTPATIENT)
Dept: FAMILY MEDICINE CLINIC | Facility: CLINIC | Age: 63
End: 2023-10-17
Payer: COMMERCIAL

## 2023-10-17 VITALS
BODY MASS INDEX: 33.33 KG/M2 | DIASTOLIC BLOOD PRESSURE: 70 MMHG | SYSTOLIC BLOOD PRESSURE: 138 MMHG | HEART RATE: 87 BPM | OXYGEN SATURATION: 98 % | HEIGHT: 69 IN | TEMPERATURE: 97.7 F | RESPIRATION RATE: 16 BRPM | WEIGHT: 225 LBS

## 2023-10-17 DIAGNOSIS — M15.9 PRIMARY OSTEOARTHRITIS INVOLVING MULTIPLE JOINTS: Chronic | ICD-10-CM

## 2023-10-17 DIAGNOSIS — R00.0 TACHYCARDIA: ICD-10-CM

## 2023-10-17 DIAGNOSIS — I10 HYPERTENSION, UNSPECIFIED TYPE: Primary | Chronic | ICD-10-CM

## 2023-10-17 DIAGNOSIS — R55 SYNCOPE, UNSPECIFIED SYNCOPE TYPE: ICD-10-CM

## 2023-10-17 PROCEDURE — 99214 OFFICE O/P EST MOD 30 MIN: CPT | Performed by: FAMILY MEDICINE

## 2023-10-17 RX ORDER — LISINOPRIL AND HYDROCHLOROTHIAZIDE 20; 12.5 MG/1; MG/1
1 TABLET ORAL DAILY
Qty: 90 TABLET | Refills: 3 | Status: CANCELLED | OUTPATIENT
Start: 2023-10-17

## 2023-10-17 NOTE — PROGRESS NOTES
"Chief Complaint  Hypertension and Rapid Heart Rate    Subjective        Jacky Rodriguez presents to [facility]  History of Present Illness    Presents for follow-up had a concussion after syncopal episode still having some issues with mental clarity secondary to concussion.  Heart rate blood pressure stable concerned about vasovagal episodes or other, no chest pain palpitations    Objective   Vital Signs:  /70   Pulse 87   Temp 97.7 °F (36.5 °C)   Resp 16   Ht 175.3 cm (69\")   Wt 102 kg (225 lb)   SpO2 98%   BMI 33.23 kg/m²   Estimated body mass index is 33.23 kg/m² as calculated from the following:    Height as of this encounter: 175.3 cm (69\").    Weight as of this encounter: 102 kg (225 lb).               Physical Exam  Vitals reviewed.   Constitutional:       Appearance: Normal appearance. He is well-developed.   HENT:      Head: Normocephalic and atraumatic.      Right Ear: External ear normal.      Left Ear: External ear normal.      Nose: Nose normal.   Eyes:      Conjunctiva/sclera: Conjunctivae normal.      Pupils: Pupils are equal, round, and reactive to light.   Cardiovascular:      Rate and Rhythm: Normal rate.   Pulmonary:      Effort: Pulmonary effort is normal.      Breath sounds: Normal breath sounds.   Abdominal:      General: There is no distension.   Skin:     General: Skin is warm and dry.   Neurological:      Mental Status: He is alert and oriented to person, place, and time. Mental status is at baseline.   Psychiatric:         Mood and Affect: Mood and affect normal.         Behavior: Behavior normal.         Thought Content: Thought content normal.         Judgment: Judgment normal.        Result Review :  The following data was reviewed by: Dnaiel Faust DO on 10/17/2023:  Common labs          7/19/2023    08:06 10/9/2023    18:48   Common Labs   Glucose 110  101    BUN 12  19    Creatinine 1.02  1.09    Sodium 140  136    Potassium 4.0  4.1  "   Chloride 102  96    Calcium 9.8  10.1    Albumin 4.3  4.3    Total Bilirubin 0.4  0.5    Alkaline Phosphatase 66  72    AST (SGOT) 35  28    ALT (SGPT) 33  26    WBC 8.77  11.78    Hemoglobin 15.1  16.2    Hematocrit 44.1  49.1    Platelets 352  332    Total Cholesterol 141     Triglycerides 56     HDL Cholesterol 36     LDL Cholesterol  93     PSA 1.710                    Assessment and Plan   Diagnoses and all orders for this visit:    1. Hypertension, unspecified type (Primary)    2. Primary osteoarthritis involving multiple joints    3. Tachycardia    4. Syncope, unspecified syncope type  -     Holter Monitor - 48 Hour; Future  -     Ambulatory Referral to Cardiology      Patient advised mental rest limiting driving and work while recovers from concussion referral to cardiology for syncopal episode Holter monitor ordered consider tilt table testing, unsure if medications are causing issues or something pathologic vs other       Follow Up   Return in about 4 weeks (around 11/14/2023) for Recheck or after cardiology holter monitor.  Patient was given instructions and counseling regarding his condition or for health maintenance advice. Please see specific information pulled into the AVS if appropriate.

## 2023-10-17 NOTE — LETTER
October 17, 2023     Patient: Jacky Rodriguez   YOB: 1960   Date of Visit: 10/17/2023       To Whom It May Concern:    It is my medical opinion that Jacky Rodriguez may return to work but limited in driving time to 6 hours. Please call with any questions or concerns.          Sincerely,        Daniel Faust DO    CC: No Recipients

## 2023-10-19 ENCOUNTER — OFFICE VISIT (OUTPATIENT)
Dept: CARDIOLOGY | Facility: CLINIC | Age: 63
End: 2023-10-19
Payer: COMMERCIAL

## 2023-10-19 VITALS
HEART RATE: 78 BPM | BODY MASS INDEX: 33.92 KG/M2 | HEIGHT: 69 IN | DIASTOLIC BLOOD PRESSURE: 86 MMHG | SYSTOLIC BLOOD PRESSURE: 132 MMHG | WEIGHT: 229 LBS

## 2023-10-19 DIAGNOSIS — I10 ESSENTIAL HYPERTENSION: ICD-10-CM

## 2023-10-19 DIAGNOSIS — R07.89 CHEST PAIN, ATYPICAL: ICD-10-CM

## 2023-10-19 DIAGNOSIS — R06.09 DYSPNEA ON EXERTION: ICD-10-CM

## 2023-10-19 DIAGNOSIS — R55 SYNCOPE AND COLLAPSE: Primary | ICD-10-CM

## 2023-10-19 PROCEDURE — 99204 OFFICE O/P NEW MOD 45 MIN: CPT | Performed by: INTERNAL MEDICINE

## 2023-10-19 NOTE — PROGRESS NOTES
"Chief Complaint  Syncope, unspecified syncope type and Hypertension      History of Present Illness  Jacky Rodriguez presents to [facility]    This is a 62-year-old gentleman with past medical history significant for hypertension presents to clinic for cardiac evaluation.  He had syncopal episode about 3 weeks ago.  It happened while he was at work.  He was sitting on a stool when he suddenly had blurry vision.  The next thing he remembers is his coworkers trying to wake him up.  He had no preceding chest discomfort, palpitations or dyspnea.  He had another syncopal episode about 2 years ago which happened while he was using the commode.  He has exertional dizziness and nausea.  He has dyspnea on mild effort.  He has sporadic episodes of anterior chest discomfort.  It varies in duration.  It is unrelated to physical activities and happens with rest.  He has no other complaints today.    Past medical history: Hypertension, erectile dysfunction      Current Outpatient Medications:     lisinopril-hydrochlorothiazide (PRINZIDE,ZESTORETIC) 20-12.5 MG per tablet, Take 1 tablet by mouth Daily., Disp: 90 tablet, Rfl: 3    metoprolol succinate XL (Toprol XL) 25 MG 24 hr tablet, Take 1 tablet by mouth Daily., Disp: 30 tablet, Rfl: 2    sildenafil (Viagra) 100 MG tablet, Take 1 tablet by mouth Daily As Needed for Erectile Dysfunction., Disp: 10 tablet, Rfl: 11    There are no discontinued medications.  Allergies   Allergen Reactions    Cefdinir Swelling        Social History     Tobacco Use    Smoking status: Never     Passive exposure: Never    Smokeless tobacco: Never   Vaping Use    Vaping Use: Never used   Substance Use Topics    Alcohol use: Not Currently     Comment: occ    Drug use: Never       Family History   Problem Relation Age of Onset    Heart failure Mother     No Known Problems Father         Objective     /86   Pulse 78   Ht 175.3 cm (69\")   Wt 104 kg (229 lb)   BMI 33.82 " "kg/m²       Physical Exam  Constitutional:       General: Awake. Not in acute distress.     Appearance: Normal appearance.   Neck:      Vascular: No carotid bruit, hepatojugular reflux or JVD.   Cardiovascular:      Rate and Rhythm: Normal rate and regular rhythm.      Chest Wall: PMI is not displaced.      Heart sounds: Normal heart sounds, S1 normal and S2 normal. No murmur heard.   No friction rub. No gallop. No S3 or S4 sounds.    Pulmonary:      Effort: Pulmonary effort is normal.      Breath sounds: Normal breath sounds. No wheezing, rhonchi or rales.   Ext.     No edema.   Skin:     General: Skin is warm and dry.      Coloration: Skin is not cyanotic.      Findings: No petechiae or rash.   Neurological:      Mental Status: Alert and oriented x 3  Psychiatric:         Behavior: Behavior is cooperative.       Result Review :     No results found for: \"PROBNP\"  CMP          7/19/2023    08:06 10/9/2023    18:48   CMP   Glucose 110  101    BUN 12  19    Creatinine 1.02  1.09    EGFR 83.6  77.2    Sodium 140  136    Potassium 4.0  4.1    Chloride 102  96    Calcium 9.8  10.1    Total Protein 7.5  8.4    Albumin 4.3  4.3    Globulin 3.2  4.1    Total Bilirubin 0.4  0.5    Alkaline Phosphatase 66  72    AST (SGOT) 35  28    ALT (SGPT) 33  26    Albumin/Globulin Ratio 1.3  1.0    BUN/Creatinine Ratio 11.8  17.4    Anion Gap 12.0  11.5      CBC w/diff          7/19/2023    08:06 10/9/2023    18:48   CBC w/Diff   WBC 8.77  11.78    RBC 5.00  5.39    Hemoglobin 15.1  16.2    Hematocrit 44.1  49.1    MCV 88.2  91.1    MCH 30.2  30.1    MCHC 34.2  33.0    RDW 12.2  12.0    Platelets 352  332    Neutrophil Rel % 47.6  53.8    Immature Granulocyte Rel % 0.7  0.4    Lymphocyte Rel % 40.6  32.4    Monocyte Rel % 8.0  10.3    Eosinophil Rel % 2.2  2.3    Basophil Rel % 0.9  0.8       Lab Results   Component Value Date    TSH 2.950 07/19/2023      Lab Results   Component Value Date    FREET4 1.51 07/19/2023      No results " "found for: \"DDIMERQUANT\"  Magnesium   Date Value Ref Range Status   10/09/2023 2.4 1.6 - 2.4 mg/dL Final      No results found for: \"DIGOXIN\"   Lab Results   Component Value Date    TROPONINT 10 10/09/2023      No results found for: \"POCTROP\"(       Lipid Panel          7/19/2023    08:06   Lipid Panel   Total Cholesterol 141    Triglycerides 56    HDL Cholesterol 36    VLDL Cholesterol 12    LDL Cholesterol  93    LDL/HDL Ratio 2.61                No results found for this or any previous visit.                Diagnoses and all orders for this visit:    1. Syncope and collapse (Primary)  -     Cardiac Event Monitor; Future  -     Adult Transthoracic Echo Complete W/ Cont if Necessary Per Protocol; Future  -     Treadmill Stress Test; Future    2. Essential hypertension    3. Dyspnea on exertion  -     Treadmill Stress Test; Future    4. Chest pain, atypical      Assessment:    Syncope: He recently had syncopal episode which happened while sitting and was preceded by blurry vision.  He had another syncopal episode about 2 years prior which happened while he was using the bathroom.  He has sporadic episodes of chest discomfort and dyspnea on mild effort.  He has exertional nausea and dizziness.  His exam today is unremarkable.  Recent ER evaluation including ECG and blood work was reviewed and was unremarkable.  The patient will be scheduled for 4-week event monitor to assess for bradycardia and tachyarrhythmias.  If this comes back negative, will consider loop recorder insertion.  He will be scheduled for treadmill stress test to assess for exercise-induced arrhythmias and ischemic ST-T changes.  Echo will be done for LVEF, wall motion and valvular function.  He was advised to remain well-hydrated.    Essential hypertension: Stable on current regimen piquantly the same.    Dyspnea on exertion/atypical chest discomfort: Testing will be done as discussed above.    Follow Up       Return for Return to clinic after " diagnostic testing, With Yudi NOYOLA.    Patient was given instructions and counseling regarding his condition or for health maintenance advice. Please see specific information pulled into the AVS if appropriate.

## 2023-10-20 ENCOUNTER — TELEPHONE (OUTPATIENT)
Dept: CARDIOLOGY | Facility: CLINIC | Age: 63
End: 2023-10-20
Payer: COMMERCIAL

## 2023-10-20 NOTE — TELEPHONE ENCOUNTER
Received message from Yudi to schedule pt a follow up with her in 6 weeks. Called pt to schedule an appt. No answer left vmail.

## 2023-10-30 ENCOUNTER — TELEPHONE (OUTPATIENT)
Dept: CARDIOLOGY | Facility: CLINIC | Age: 63
End: 2023-10-30

## 2023-10-30 NOTE — TELEPHONE ENCOUNTER
----- Message from JAZMÍN Watt sent at 10/30/2023 12:52 PM EDT -----  Notify pt echo result:  ·  Left ventricular systolic function is normal. Calculated left ventricular EF = 59.5%  ·  No hemodynamically significant valvular pathology.  Continue with current therapy and follow up as scheduled

## 2023-11-15 ENCOUNTER — OFFICE VISIT (OUTPATIENT)
Dept: FAMILY MEDICINE CLINIC | Facility: CLINIC | Age: 63
End: 2023-11-15
Payer: COMMERCIAL

## 2023-11-15 VITALS
RESPIRATION RATE: 18 BRPM | DIASTOLIC BLOOD PRESSURE: 84 MMHG | WEIGHT: 232.2 LBS | HEART RATE: 62 BPM | BODY MASS INDEX: 34.39 KG/M2 | SYSTOLIC BLOOD PRESSURE: 138 MMHG | TEMPERATURE: 97.8 F | HEIGHT: 69 IN | OXYGEN SATURATION: 95 %

## 2023-11-15 DIAGNOSIS — R55 SYNCOPE, UNSPECIFIED SYNCOPE TYPE: ICD-10-CM

## 2023-11-15 DIAGNOSIS — Z23 NEED FOR INFLUENZA VACCINATION: ICD-10-CM

## 2023-11-15 DIAGNOSIS — M15.9 PRIMARY OSTEOARTHRITIS INVOLVING MULTIPLE JOINTS: Chronic | ICD-10-CM

## 2023-11-15 DIAGNOSIS — R00.0 TACHYCARDIA: ICD-10-CM

## 2023-11-15 DIAGNOSIS — I10 HYPERTENSION, UNSPECIFIED TYPE: Primary | Chronic | ICD-10-CM

## 2023-11-15 DIAGNOSIS — R73.09 ELEVATED GLUCOSE: ICD-10-CM

## 2023-11-15 PROCEDURE — 99214 OFFICE O/P EST MOD 30 MIN: CPT | Performed by: FAMILY MEDICINE

## 2023-11-15 RX ORDER — LISINOPRIL AND HYDROCHLOROTHIAZIDE 20; 12.5 MG/1; MG/1
1 TABLET ORAL DAILY
Qty: 90 TABLET | Refills: 3 | Status: SHIPPED | OUTPATIENT
Start: 2023-11-15

## 2023-11-15 RX ORDER — METOPROLOL SUCCINATE 25 MG/1
25 TABLET, EXTENDED RELEASE ORAL DAILY
Qty: 90 TABLET | Refills: 3 | Status: SHIPPED | OUTPATIENT
Start: 2023-11-15

## 2023-11-15 NOTE — PROGRESS NOTES
"Chief Complaint  Follow-up, Hypertension, Osteoarthritis, Rapid Heart Rate, and Syncope    Subjective        Jacky Rodriguez presents to [facility]  History of Present Illness    Patient presents to follow-up on blood pressure syncopal episode seen cardiology had monitor her cardiac event monitor echo that was overall normal other than some mild impaired relaxation, was recommended to stay well-hydrated take medicine as prescribed blood pressure today 138/84 monitoring at home. last labs 10/2023 or so overall okay, mildly elevated WBC above 10 otherwise good CBC CMP other than mildly elevated glucose 101. Last lipid 7/2023 LDL 93, HDL low at 36.     Objective   Vital Signs:  /84   Pulse 62   Temp 97.8 °F (36.6 °C)   Resp 18   Ht 175.3 cm (69\")   Wt 105 kg (232 lb 3.2 oz)   SpO2 95%   BMI 34.29 kg/m²   Estimated body mass index is 34.29 kg/m² as calculated from the following:    Height as of this encounter: 175.3 cm (69\").    Weight as of this encounter: 105 kg (232 lb 3.2 oz).               Physical Exam  Vitals reviewed.   Constitutional:       Appearance: Normal appearance. He is well-developed.   HENT:      Head: Normocephalic and atraumatic.      Right Ear: External ear normal.      Left Ear: External ear normal.      Nose: Nose normal.   Eyes:      Conjunctiva/sclera: Conjunctivae normal.      Pupils: Pupils are equal, round, and reactive to light.   Cardiovascular:      Rate and Rhythm: Normal rate.   Pulmonary:      Effort: Pulmonary effort is normal.      Breath sounds: Normal breath sounds.   Abdominal:      General: There is no distension.   Skin:     General: Skin is warm and dry.   Neurological:      Mental Status: He is alert and oriented to person, place, and time. Mental status is at baseline.   Psychiatric:         Mood and Affect: Mood and affect normal.         Behavior: Behavior normal.         Thought Content: Thought content normal.         Judgment: " Judgment normal.        Result Review :  The following data was reviewed by: Daniel Faust DO on 11/15/2023:  Common labs          7/19/2023    08:06 10/9/2023    18:48   Common Labs   Glucose 110  101    BUN 12  19    Creatinine 1.02  1.09    Sodium 140  136    Potassium 4.0  4.1    Chloride 102  96    Calcium 9.8  10.1    Albumin 4.3  4.3    Total Bilirubin 0.4  0.5    Alkaline Phosphatase 66  72    AST (SGOT) 35  28    ALT (SGPT) 33  26    WBC 8.77  11.78    Hemoglobin 15.1  16.2    Hematocrit 44.1  49.1    Platelets 352  332    Total Cholesterol 141     Triglycerides 56     HDL Cholesterol 36     LDL Cholesterol  93     PSA 1.710       Data reviewed : Cardiology studies event monitor, ECHO and notes from specialist in past month 10/2023             Assessment and Plan   Diagnoses and all orders for this visit:    1. Hypertension, unspecified type (Primary)    2. Primary osteoarthritis involving multiple joints  -     lisinopril-hydrochlorothiazide (PRINZIDE,ZESTORETIC) 20-12.5 MG per tablet; Take 1 tablet by mouth Daily.  Dispense: 90 tablet; Refill: 3  -     CBC (No Diff); Future  -     TSH+Free T4; Future  -     Comprehensive Metabolic Panel; Future  -     Lipid Panel; Future    3. Tachycardia    4. Syncope, unspecified syncope type    5. Elevated glucose  -     Hemoglobin A1c; Future      Continue medicines  Follow up cardiology  3 mo follow up labs before  Monitor BP at home  Labs ordered to do in the next 3 months             Follow Up   Return in about 3 months (around 2/15/2024), or if symptoms worsen or fail to improve, for Next scheduled follow up, Recheck, Labs before.  Patient was given instructions and counseling regarding his condition or for health maintenance advice. Please see specific information pulled into the AVS if appropriate.

## 2023-11-29 ENCOUNTER — TELEPHONE (OUTPATIENT)
Dept: CARDIOLOGY | Facility: CLINIC | Age: 63
End: 2023-11-29
Payer: COMMERCIAL

## 2023-11-29 NOTE — TELEPHONE ENCOUNTER
----- Message from JAZMÍN Simpson sent at 11/29/2023 10:02 AM EST -----  Notify patient the results of their Holter monitor were unremarkable. There were some occasional skipped beats noted but no sustained ectopy or concerning arrhythmias.

## 2024-01-05 ENCOUNTER — TELEPHONE (OUTPATIENT)
Dept: CARDIOLOGY | Facility: CLINIC | Age: 64
End: 2024-01-05

## 2024-01-05 NOTE — TELEPHONE ENCOUNTER
Hub staff attempted to follow warm transfer process and was unsuccessful     Caller: Jacky Rodriguez    Relationship to patient: Self    Best call back number: [phone]     Patient is needing: PT IS NEEDING TO CANCEL STRESS TEST

## 2024-02-28 ENCOUNTER — APPOINTMENT (OUTPATIENT)
Dept: CT IMAGING | Facility: HOSPITAL | Age: 64
End: 2024-02-28
Payer: COMMERCIAL

## 2024-02-28 ENCOUNTER — HOSPITAL ENCOUNTER (EMERGENCY)
Facility: HOSPITAL | Age: 64
Discharge: HOME | End: 2024-02-28
Attending: EMERGENCY MEDICINE | Admitting: EMERGENCY MEDICINE
Payer: COMMERCIAL

## 2024-02-28 ENCOUNTER — APPOINTMENT (OUTPATIENT)
Dept: GENERAL RADIOLOGY | Facility: HOSPITAL | Age: 64
End: 2024-02-28
Payer: COMMERCIAL

## 2024-02-28 VITALS
DIASTOLIC BLOOD PRESSURE: 97 MMHG | BODY MASS INDEX: 35.99 KG/M2 | WEIGHT: 237.44 LBS | OXYGEN SATURATION: 97 % | HEIGHT: 68 IN | SYSTOLIC BLOOD PRESSURE: 135 MMHG | RESPIRATION RATE: 18 BRPM | TEMPERATURE: 97.6 F | HEART RATE: 89 BPM

## 2024-02-28 DIAGNOSIS — K57.90 DIVERTICULOSIS: ICD-10-CM

## 2024-02-28 DIAGNOSIS — K21.9 GASTROESOPHAGEAL REFLUX DISEASE WITHOUT ESOPHAGITIS: Primary | ICD-10-CM

## 2024-02-28 DIAGNOSIS — R19.02 LEFT UPPER QUADRANT ABDOMINAL MASS: ICD-10-CM

## 2024-02-28 DIAGNOSIS — K42.9 UMBILICAL HERNIA WITHOUT OBSTRUCTION AND WITHOUT GANGRENE: ICD-10-CM

## 2024-02-28 DIAGNOSIS — R16.0 HEPATOMEGALY: ICD-10-CM

## 2024-02-28 LAB
ALBUMIN SERPL-MCNC: 4.3 G/DL (ref 3.5–5.2)
ALBUMIN/GLOB SERPL: 1.1 G/DL
ALP SERPL-CCNC: 82 U/L (ref 39–117)
ALT SERPL W P-5'-P-CCNC: 33 U/L (ref 1–41)
ANION GAP SERPL CALCULATED.3IONS-SCNC: 12.4 MMOL/L (ref 5–15)
AST SERPL-CCNC: 33 U/L (ref 1–40)
BASOPHILS # BLD AUTO: 0.09 10*3/MM3 (ref 0–0.2)
BASOPHILS NFR BLD AUTO: 0.8 % (ref 0–1.5)
BILIRUB SERPL-MCNC: 0.4 MG/DL (ref 0–1.2)
BUN SERPL-MCNC: 13 MG/DL (ref 8–23)
BUN/CREAT SERPL: 12.4 (ref 7–25)
CALCIUM SPEC-SCNC: 9.8 MG/DL (ref 8.6–10.5)
CHLORIDE SERPL-SCNC: 98 MMOL/L (ref 98–107)
CO2 SERPL-SCNC: 27.6 MMOL/L (ref 22–29)
CREAT SERPL-MCNC: 1.05 MG/DL (ref 0.76–1.27)
DEPRECATED RDW RBC AUTO: 39 FL (ref 37–54)
EGFRCR SERPLBLD CKD-EPI 2021: 80.3 ML/MIN/1.73
EOSINOPHIL # BLD AUTO: 0.21 10*3/MM3 (ref 0–0.4)
EOSINOPHIL NFR BLD AUTO: 2 % (ref 0.3–6.2)
ERYTHROCYTE [DISTWIDTH] IN BLOOD BY AUTOMATED COUNT: 11.9 % (ref 12.3–15.4)
GLOBULIN UR ELPH-MCNC: 3.8 GM/DL
GLUCOSE SERPL-MCNC: 172 MG/DL (ref 65–99)
HCT VFR BLD AUTO: 48.1 % (ref 37.5–51)
HGB BLD-MCNC: 15.6 G/DL (ref 13–17.7)
HOLD SPECIMEN: NORMAL
HOLD SPECIMEN: NORMAL
IMM GRANULOCYTES # BLD AUTO: 0.05 10*3/MM3 (ref 0–0.05)
IMM GRANULOCYTES NFR BLD AUTO: 0.5 % (ref 0–0.5)
LYMPHOCYTES # BLD AUTO: 4.4 10*3/MM3 (ref 0.7–3.1)
LYMPHOCYTES NFR BLD AUTO: 41 % (ref 19.6–45.3)
MCH RBC QN AUTO: 29 PG (ref 26.6–33)
MCHC RBC AUTO-ENTMCNC: 32.4 G/DL (ref 31.5–35.7)
MCV RBC AUTO: 89.4 FL (ref 79–97)
MONOCYTES # BLD AUTO: 0.83 10*3/MM3 (ref 0.1–0.9)
MONOCYTES NFR BLD AUTO: 7.7 % (ref 5–12)
NEUTROPHILS NFR BLD AUTO: 48 % (ref 42.7–76)
NEUTROPHILS NFR BLD AUTO: 5.15 10*3/MM3 (ref 1.7–7)
NRBC BLD AUTO-RTO: 0 /100 WBC (ref 0–0.2)
NT-PROBNP SERPL-MCNC: 94.3 PG/ML (ref 0–900)
PLATELET # BLD AUTO: 354 10*3/MM3 (ref 140–450)
PMV BLD AUTO: 10.4 FL (ref 6–12)
POTASSIUM SERPL-SCNC: 3.9 MMOL/L (ref 3.5–5.2)
PROT SERPL-MCNC: 8.1 G/DL (ref 6–8.5)
QT INTERVAL: 382 MS
QTC INTERVAL: 478 MS
RBC # BLD AUTO: 5.38 10*6/MM3 (ref 4.14–5.8)
SODIUM SERPL-SCNC: 138 MMOL/L (ref 136–145)
TROPONIN T SERPL HS-MCNC: 12 NG/L
WBC NRBC COR # BLD AUTO: 10.73 10*3/MM3 (ref 3.4–10.8)
WHOLE BLOOD HOLD COAG: NORMAL
WHOLE BLOOD HOLD SPECIMEN: NORMAL

## 2024-02-28 PROCEDURE — 93005 ELECTROCARDIOGRAM TRACING: CPT

## 2024-02-28 PROCEDURE — 80053 COMPREHEN METABOLIC PANEL: CPT

## 2024-02-28 PROCEDURE — 84484 ASSAY OF TROPONIN QUANT: CPT

## 2024-02-28 PROCEDURE — 96375 TX/PRO/DX INJ NEW DRUG ADDON: CPT

## 2024-02-28 PROCEDURE — 83880 ASSAY OF NATRIURETIC PEPTIDE: CPT

## 2024-02-28 PROCEDURE — 93005 ELECTROCARDIOGRAM TRACING: CPT | Performed by: EMERGENCY MEDICINE

## 2024-02-28 PROCEDURE — 74177 CT ABD & PELVIS W/CONTRAST: CPT

## 2024-02-28 PROCEDURE — 36415 COLL VENOUS BLD VENIPUNCTURE: CPT

## 2024-02-28 PROCEDURE — 25010000002 KETOROLAC TROMETHAMINE PER 15 MG

## 2024-02-28 PROCEDURE — 71045 X-RAY EXAM CHEST 1 VIEW: CPT

## 2024-02-28 PROCEDURE — 96374 THER/PROPH/DIAG INJ IV PUSH: CPT

## 2024-02-28 PROCEDURE — 99285 EMERGENCY DEPT VISIT HI MDM: CPT

## 2024-02-28 PROCEDURE — 25510000001 IOPAMIDOL PER 1 ML: Performed by: EMERGENCY MEDICINE

## 2024-02-28 PROCEDURE — 85025 COMPLETE CBC W/AUTO DIFF WBC: CPT

## 2024-02-28 RX ORDER — KETOROLAC TROMETHAMINE 30 MG/ML
30 INJECTION, SOLUTION INTRAMUSCULAR; INTRAVENOUS ONCE
Status: COMPLETED | OUTPATIENT
Start: 2024-02-28 | End: 2024-02-28

## 2024-02-28 RX ORDER — PANTOPRAZOLE SODIUM 20 MG/1
20 TABLET, DELAYED RELEASE ORAL DAILY
Qty: 30 TABLET | Refills: 0 | Status: SHIPPED | OUTPATIENT
Start: 2024-02-28

## 2024-02-28 RX ORDER — SODIUM CHLORIDE 0.9 % (FLUSH) 0.9 %
10 SYRINGE (ML) INJECTION AS NEEDED
Status: DISCONTINUED | OUTPATIENT
Start: 2024-02-28 | End: 2024-02-28 | Stop reason: HOSPADM

## 2024-02-28 RX ORDER — PANTOPRAZOLE SODIUM 40 MG/10ML
40 INJECTION, POWDER, LYOPHILIZED, FOR SOLUTION INTRAVENOUS ONCE
Status: COMPLETED | OUTPATIENT
Start: 2024-02-28 | End: 2024-02-28

## 2024-02-28 RX ADMIN — IOPAMIDOL 100 ML: 755 INJECTION, SOLUTION INTRAVENOUS at 18:08

## 2024-02-28 RX ADMIN — PANTOPRAZOLE SODIUM 40 MG: 40 INJECTION, POWDER, FOR SOLUTION INTRAVENOUS at 17:23

## 2024-02-28 RX ADMIN — KETOROLAC TROMETHAMINE 30 MG: 30 INJECTION, SOLUTION INTRAMUSCULAR; INTRAVENOUS at 17:22

## 2024-02-28 NOTE — ED PROVIDER NOTES
Time: 5:11 PM EST  Date of encounter:  2/28/2024  Independent Historian/Clinical History and Information was obtained by:   Patient    History is limited by: N/A    Chief Complaint   Patient presents with    Shortness of Breath    Fatigue    Hypertension         History of Present Illness:  Patient is a 63 y.o. year old male who presents to the emergency department for evaluation of hypertension and shortness of breath.  Patient states blood pressure earlier today was 161/106, he takes metoprolol and lisinopril as prescribed.  Patient states he is also been having a ventral hernia for over a year but worsening pain that he describes as a burning sensation for the past month.  He called his PCP office earlier today and advised him to come to the ED for patient states he gets short of breath with bending over to tie his shoes.  States that he will wake up in the morning around 2 or 3 AM with burning in his stomach.  He has a history of GERD but does not take any medications.  Denies nausea, vomiting and diarrhea.    Patient Care Team  Primary Care Provider: Daniel Faust DO    Past Medical History:     Allergies   Allergen Reactions    Cefdinir Swelling     History reviewed. No pertinent past medical history.  Past Surgical History:   Procedure Laterality Date    PARTIAL HIP ARTHROPLASTY Bilateral      Family History   Problem Relation Age of Onset    Heart failure Mother     No Known Problems Father        Home Medications:  Prior to Admission medications    Medication Sig Start Date End Date Taking? Authorizing Provider   lisinopril-hydrochlorothiazide (PRINZIDE,ZESTORETIC) 20-12.5 MG per tablet Take 1 tablet by mouth Daily. 11/15/23  Yes Daniel Faust DO   metoprolol succinate XL (Toprol XL) 25 MG 24 hr tablet Take 1 tablet by mouth Daily. 11/15/23  Yes Daniel Faust DO   sildenafil (Viagra) 100 MG tablet Take 1 tablet by mouth Daily As Needed for Erectile Dysfunction. 7/19/23   Daniel Faust, DO        Social  "History:   Social History     Tobacco Use    Smoking status: Never     Passive exposure: Never    Smokeless tobacco: Never   Vaping Use    Vaping Use: Never used   Substance Use Topics    Alcohol use: Not Currently     Comment: occ    Drug use: Never         Review of Systems:  Review of Systems   Constitutional: Negative.    HENT: Negative.     Eyes: Negative.    Respiratory:  Positive for shortness of breath.    Cardiovascular: Negative.    Gastrointestinal:  Positive for abdominal pain.   Endocrine: Negative.    Genitourinary: Negative.    Musculoskeletal: Negative.    Skin: Negative.    Allergic/Immunologic: Negative.    Neurological: Negative.    Hematological: Negative.    Psychiatric/Behavioral: Negative.          Physical Exam:  /97   Pulse 89   Temp 97.6 °F (36.4 °C) (Oral)   Resp 18   Ht 172.7 cm (68\")   Wt 108 kg (237 lb 7 oz)   SpO2 97%   BMI 36.10 kg/m²         Physical Exam  Vitals and nursing note reviewed.   Constitutional:       Appearance: Normal appearance. He is normal weight.   HENT:      Head: Normocephalic and atraumatic.      Left Ear: Tympanic membrane normal.      Nose: Nose normal.      Mouth/Throat:      Mouth: Mucous membranes are moist.   Eyes:      Extraocular Movements: Extraocular movements intact.      Conjunctiva/sclera: Conjunctivae normal.      Pupils: Pupils are equal, round, and reactive to light.   Cardiovascular:      Rate and Rhythm: Normal rate and regular rhythm.      Heart sounds: Normal heart sounds.   Pulmonary:      Effort: Pulmonary effort is normal.      Breath sounds: Normal breath sounds.   Abdominal:      General: Abdomen is flat. There is distension.      Palpations: Abdomen is soft.      Tenderness: There is abdominal tenderness. There is no guarding or rebound.      Hernia: A hernia is present. Hernia is present in the ventral area.   Musculoskeletal:         General: Normal range of motion.      Cervical back: Normal range of motion and neck " supple.   Skin:     General: Skin is warm and dry.   Neurological:      General: No focal deficit present.      Mental Status: He is alert and oriented to person, place, and time.   Psychiatric:         Mood and Affect: Mood normal.         Behavior: Behavior normal.                Procedures:  Procedures      Medical Decision Making:      Comorbidities that affect care:    Hypertension    External Notes reviewed:    Previous Clinic Note: PCP 11/15/2023      The following orders were placed and all results were independently analyzed by me:  Orders Placed This Encounter   Procedures    XR Chest 1 View    CT Abdomen Pelvis With Contrast    [facility] Draw    Comprehensive Metabolic Panel    BNP    Single High Sensitivity Troponin T    CBC Auto Differential    NPO Diet NPO Type: Strict NPO    Undress & Gown    Continuous Pulse Oximetry    Vital Signs    Oxygen Therapy- Nasal Cannula; Titrate 1-6 LPM Per SpO2; 90 - 95%    ECG 12 Lead ED Triage Standing Order; SOA    Insert Peripheral IV    CBC & Differential    Green Top (Gel)    Lavender Top    Gold Top - SST    Light Blue Top       Medications Given in the Emergency Department:  Medications   sodium chloride 0.9 % flush 10 mL (has no administration in time range)   ketorolac (TORADOL) injection 30 mg (30 mg Intravenous Given 2/28/24 1722)   pantoprazole (PROTONIX) injection 40 mg (40 mg Intravenous Given 2/28/24 1723)   iopamidol (ISOVUE-370) 76 % injection 100 mL (100 mL Intravenous Given 2/28/24 1808)        ED Course:    The patient was initially evaluated in the triage area where orders were placed. The patient was later dispositioned by Alexey Valadez PA-C.      The patient was advised to stay for completion of workup which includes but is not limited to communication of labs and radiological results, reassessment and plan. The patient was advised that leaving prior to disposition by a provider could result in critical findings that are not communicated to the  patient.          Labs:    Lab Results (last 24 hours)       Procedure Component Value Units Date/Time    CBC & Differential [[id number]]  (Abnormal) Collected: 02/28/24 1444    Specimen: Blood from Arm, Right Updated: 02/28/24 1457    Narrative:      The following orders were created for panel order CBC & Differential.  Procedure                               Abnormality         Status                     ---------                               -----------         ------                     CBC Auto Differential[[id number]]        Abnormal            Final result                 Please view results for these tests on the individual orders.    Comprehensive Metabolic Panel [[id number]]  (Abnormal) Collected: 02/28/24 1444    Specimen: Blood from Arm, Right Updated: 02/28/24 1518     Glucose 172 mg/dL      BUN 13 mg/dL      Creatinine 1.05 mg/dL      Sodium 138 mmol/L      Potassium 3.9 mmol/L      Comment: Slight hemolysis detected by analyzer. Result may be falsely elevated.        Chloride 98 mmol/L      CO2 27.6 mmol/L      Calcium 9.8 mg/dL      Total Protein 8.1 g/dL      Albumin 4.3 g/dL      ALT (SGPT) 33 U/L      AST (SGOT) 33 U/L      Alkaline Phosphatase 82 U/L      Total Bilirubin 0.4 mg/dL      Globulin 3.8 gm/dL      A/G Ratio 1.1 g/dL      BUN/Creatinine Ratio 12.4     Anion Gap 12.4 mmol/L      eGFR 80.3 mL/min/1.73     Narrative:      GFR Normal >60  Chronic Kidney Disease <60  Kidney Failure <15      BNP [[id number]]  (Normal) Collected: 02/28/24 1444    Specimen: Blood from Arm, Right Updated: 02/28/24 1515     proBNP 94.3 pg/mL     Narrative:      This assay is used as an aid in the diagnosis of individuals suspected of having heart failure. It can be used as an aid in the diagnosis of acute decompensated heart failure (ADHF) in patients presenting with signs and symptoms of ADHF to the emergency department (ED). In addition, NT-proBNP of <300 pg/mL indicates ADHF is not likely.    Age Range Result  Interpretation  NT-proBNP Concentration (pg/mL:      <50             Positive            >450                   Gray                 300-450                    Negative             <300    50-75           Positive            >900                  Gray                300-900                  Negative            <300      >75             Positive            >1800                  Gray                300-1800                  Negative            <300    Single High Sensitivity Troponin T [[id number]]  (Normal) Collected: 02/28/24 1444    Specimen: Blood from Arm, Right Updated: 02/28/24 1515     HS Troponin T 12 ng/L     Narrative:      High Sensitive Troponin T Reference Range:  <14.0 ng/L- Negative Female for AMI  <22.0 ng/L- Negative Male for AMI  >=14 - Abnormal Female indicating possible myocardial injury.  >=22 - Abnormal Male indicating possible myocardial injury.   Clinicians would have to utilize clinical acumen, EKG, Troponin, and serial changes to determine if it is an Acute Myocardial Infarction or myocardial injury due to an underlying chronic condition.         CBC Auto Differential [[id number]]  (Abnormal) Collected: 02/28/24 1444    Specimen: Blood from Arm, Right Updated: 02/28/24 1457     WBC 10.73 10*3/mm3      RBC 5.38 10*6/mm3      Hemoglobin 15.6 g/dL      Hematocrit 48.1 %      MCV 89.4 fL      MCH 29.0 pg      MCHC 32.4 g/dL      RDW 11.9 %      RDW-SD 39.0 fl      MPV 10.4 fL      Platelets 354 10*3/mm3      Neutrophil % 48.0 %      Lymphocyte % 41.0 %      Monocyte % 7.7 %      Eosinophil % 2.0 %      Basophil % 0.8 %      Immature Grans % 0.5 %      Neutrophils, Absolute 5.15 10*3/mm3      Lymphocytes, Absolute 4.40 10*3/mm3      Monocytes, Absolute 0.83 10*3/mm3      Eosinophils, Absolute 0.21 10*3/mm3      Basophils, Absolute 0.09 10*3/mm3      Immature Grans, Absolute 0.05 10*3/mm3      nRBC 0.0 /100 WBC              Imaging:    CT Abdomen Pelvis With Contrast    Result Date:  2/28/2024  PROCEDURE: CT ABDOMEN PELVIS W CONTRAST  COMPARISON: None  INDICATIONS: Large ventral hernia, nausea/vomiting, abdominal burning  TECHNIQUE: After obtaining the patient's consent, CT images were created with non-ionic intravenous contrast material.   PROTOCOL:   Standard imaging protocol performed    RADIATION:   DLP: 1111mGy*cm   Automated exposure control was utilized to minimize radiation dose. CONTRAST: 100cc Isovue 370 I.V.  FINDINGS:  The lung bases are clear.  The liver is diffusely fatty infiltrated without suspicious lesion.  The gallbladder is present.  There is no biliary dilatation.  The spleen, pancreas, adrenal glands and kidneys have a normal CT appearance.  The bladder and solid pelvic organs are grossly normal.  There is extensive streak artifact in the pelvis from bilateral hip prostheses which degrades image quality.  The sigmoid colon is redundant and there is extensive diverticulosis in the descending and sigmoid colon.  No CT signs of acute diverticulitis.  The GI tract is decompressed .  The appendix is normal.  On axial CT image 130 through 142 there is a 2 point 3 cm area of possible small bowel intussusception which may be incidental and transient but it is demonstrated on both the routine portal venous phase images as well as the delayed phase images.  Focal small-bowel mass cannot be excluded.  No pathologically enlarged lymph nodes.  The abdominal aorta is widely patent and nonaneurysmal.  No aggressive focal osseous lesions or acute bony abnormalities.  The clinical history of large ventral hernia with abdominal burning and nausea and vomiting.  There is a tiny fat containing umbilical hernia but no other ventral wall hernias are demonstrated.  Subcutaneous soft tissues are otherwise unremarkable.       1. No acute intra-abdominal or intrapelvic abnormalities. 2.  Mild hepatomegaly with diffuse hepatic steatosis . 3. Extensive sigmoid diverticulosis without diverticulitis.    4. There is  a 2.3 cm slightly hyperdense circumscribed area in the mid small bowel in the left abdomen, mass is not excluded, this could be a developing transient small bowel intussusception but it does not have the classic appearance as such and there are no prior studies for comparison.  Consider a follow-up nonemergent small-bowel follow-through or possibly CT enterography if there are no prior abdominal and pelvic CTs at other institutions for comparison.     BRUCE GORE DO       Electronically Signed and Approved By: BRUCE GORE DO on 2/28/2024 at 18:39             XR Chest 1 View    Result Date: 2/28/2024  PROCEDURE: XR CHEST 1 VW  COMPARISON: [facility], , CHEST PA/AP & LAT 2V, 12/23/2019, 12:14.  INDICATIONS: SOA Triage Protocol  elevated bp  short of air  FINDINGS:  The lungs are clear bilaterally.  The cardiac and mediastinal silhouettes appear normal.  No effusion is seen.        1. No acute cardiopulmonary disease       Jose Torres M.D.       Electronically Signed and Approved By: Jose Torres M.D. on 2/28/2024 at 15:04                Differential Diagnosis and Discussion:      Abdominal Pain: Based on the patient's signs and symptoms, I considered abdominal aortic aneurysm, small bowel obstruction, pancreatitis, acute cholecystitis, acute appendecitis, peptic ulcer disease, gastritis, colitis, endocrine disorders, irritable bowel syndrome and other differential diagnosis an etiology of the patient's abdominal pain.  Dysuria: Differential diagnosis includes but is not limited to urethritis, cystitis, pyelonephritis, ureteral calculi, neoplasm, chemical irritant, urethral stricture, and trauma  Metabolic: Differential diagnosis includes but is not limited to hypertension, hyperglycemia, hyperkalemia, hypocalcemia, metabolic acidosis, hypokalemia, hypoglycemia, malnutrition, hypothyroidism, hyperthyroidism, and adrenal insufficiency.     All labs were reviewed and interpreted  by me.  All X-rays impressions were independently interpreted by me.  EKG was interpreted by me.  EKG was interpreted by supervising attending.  CT scan radiology impression was interpreted by me.    MDM     Amount and/or Complexity of Data Reviewed  Clinical lab tests: reviewed  Tests in the radiology section of CPT®: reviewed  Tests in the medicine section of CPT®: reviewed                 Patient Care Considerations:    CONSULT: I considered consulting general surgery, however  CT radiologist does not show any acute findings.  Recommend outpatient nonemergent CT      Consultants/Shared Management Plan:    None    Social Determinants of Health:    Patient is independent, reliable, and has access to care.       Disposition and Care Coordination:    Discharged: The patient is suitable and stable for discharge with no need for consideration of admission.    I have explained the patient´s condition, diagnoses and treatment plan based on the information available to me at this time. I have answered questions and addressed any concerns. The patient has a good  understanding of the patient´s diagnosis, condition, and treatment plan as can be expected at this point. The vital signs have been stable. The patient´s condition is stable and appropriate for discharge from the emergency department.      The patient will pursue further outpatient evaluation with the primary care physician or other designated or consulting physician as outlined in the discharge instructions. They are agreeable to this plan of care and follow-up instructions have been explained in detail. The patient has received these instructions in written format and has expressed an understanding of the discharge instructions. The patient is aware that any significant change in condition or worsening of symptoms should prompt an immediate return to this or the closest emergency department or call to 911.  I have explained discharge medications and the need for  follow up with the patient/caretakers. This was also printed in the discharge instructions. Patient was discharged with the following medications and follow up:      Medication List        New Prescriptions      pantoprazole 20 MG EC tablet  Commonly known as: PROTONIX  Take 1 tablet by mouth Daily.               Where to Get Your Medications        These medications were sent to [facility] - [place], [place] - [address] [place], [place] [address] - [phone]  - [phone] FX  [address] [facility], [facility] [address]      Phone: [phone]   pantoprazole 20 MG EC tablet      Daniel Faust, DO  [address]  [place] [address]  [phone]    In 3 days         Final diagnoses:   Gastroesophageal reflux disease without esophagitis   Diverticulosis   Hepatomegaly (mild)   Left upper quadrant abdominal mass   Umbilical hernia without obstruction and without gangrene        ED Disposition       ED Disposition   Discharge    Condition   Stable    Comment   --               This medical record created using voice recognition software.             Alexey Valadez PA-C  02/28/24 1917

## 2024-02-29 NOTE — DISCHARGE INSTRUCTIONS
Blood pressure is not elevated to hypertensive emergency or urgency during her ED visit, please continue taking hypertensive medications as prescribed    CT shows that you have 6 in your abdomen, I recommend you get an nonemergent CT taking orders from the PCP to rule out abdominal mass versus intussusception.     CT also shows that you have mild enlarged liver and diverticulosis of the colon.  This just needs follow-up with your PCP    Please take Protonix for acid reflux

## 2024-03-07 ENCOUNTER — OFFICE VISIT (OUTPATIENT)
Dept: FAMILY MEDICINE CLINIC | Facility: CLINIC | Age: 64
End: 2024-03-07
Payer: COMMERCIAL

## 2024-03-07 VITALS
HEART RATE: 80 BPM | BODY MASS INDEX: 36.53 KG/M2 | WEIGHT: 241 LBS | HEIGHT: 68 IN | SYSTOLIC BLOOD PRESSURE: 139 MMHG | DIASTOLIC BLOOD PRESSURE: 79 MMHG | TEMPERATURE: 97 F | RESPIRATION RATE: 20 BRPM | OXYGEN SATURATION: 96 %

## 2024-03-07 DIAGNOSIS — K56.609 SMALL BOWEL OBSTRUCTION: Primary | ICD-10-CM

## 2024-03-07 DIAGNOSIS — I10 PRIMARY HYPERTENSION: Chronic | ICD-10-CM

## 2024-03-07 DIAGNOSIS — K21.9 GASTROESOPHAGEAL REFLUX DISEASE WITHOUT ESOPHAGITIS: ICD-10-CM

## 2024-03-07 DIAGNOSIS — K43.6 VENTRAL HERNIA WITH OBSTRUCTION AND WITHOUT GANGRENE: Chronic | ICD-10-CM

## 2024-03-07 DIAGNOSIS — R21 RASH: ICD-10-CM

## 2024-03-07 PROCEDURE — 99214 OFFICE O/P EST MOD 30 MIN: CPT | Performed by: FAMILY MEDICINE

## 2024-03-07 RX ORDER — PANTOPRAZOLE SODIUM 20 MG/1
20 TABLET, DELAYED RELEASE ORAL DAILY
Qty: 90 TABLET | Refills: 3 | Status: SHIPPED | OUTPATIENT
Start: 2024-03-07 | End: 2024-03-13 | Stop reason: DRUGHIGH

## 2024-03-07 NOTE — PROGRESS NOTES
"Chief Complaint  Hypertension, Follow-up (Blood pressure was elevated.), Shortness of Breath, Rash (Top of head x2 weeks.), and Hernia (Upper abdomen.)    Subjective          Jacky Rodriguez presents to [facility]  Hypertension  Associated symptoms include shortness of breath.   Shortness of Breath  Associated symptoms include a rash.   Rash  Associated symptoms include shortness of breath.   Hernia  Associated symptoms include a rash.     Patient continues to have issues with hypertension and has ventral hernia and ongoing issues related to recent ER visit and small bowel obstruction that was resolved but still having issues with relation to developing symptoms of near syncope and passing out        Objective   Vital Signs:   /79 (BP Location: Left arm, Patient Position: Sitting, Cuff Size: Adult)   Pulse 80   Temp 97 °F (36.1 °C) (Temporal)   Resp 20   Ht 172.7 cm (68\")   Wt 109 kg (241 lb)   SpO2 96%   BMI 36.64 kg/m²            Physical Exam  Vitals reviewed.   Constitutional:       Appearance: Normal appearance. He is well-developed.   HENT:      Head: Normocephalic and atraumatic.      Right Ear: External ear normal.      Left Ear: External ear normal.      Nose: Nose normal.   Eyes:      Conjunctiva/sclera: Conjunctivae normal.      Pupils: Pupils are equal, round, and reactive to light.   Cardiovascular:      Rate and Rhythm: Normal rate.   Pulmonary:      Effort: Pulmonary effort is normal.      Breath sounds: Normal breath sounds.   Abdominal:      General: There is distension.      Tenderness: There is abdominal tenderness.      Hernia: A hernia is present.   Skin:     General: Skin is warm and dry.      Findings: Rash present.   Neurological:      Mental Status: He is alert and oriented to person, place, and time. Mental status is at baseline.   Psychiatric:         Mood and Affect: Mood and affect normal.         Behavior: Behavior normal.         Thought " Content: Thought content normal.         Judgment: Judgment normal.          Result Review :   The following data was reviewed by: Daniel Faust DO on 03/07/2024:  Common labs          7/19/2023    08:06 10/9/2023    18:48 2/28/2024    14:44   Common Labs   Glucose 110  101  172    BUN 12  19  13    Creatinine 1.02  1.09  1.05    Sodium 140  136  138    Potassium 4.0  4.1  3.9    Chloride 102  96  98    Calcium 9.8  10.1  9.8    Albumin 4.3  4.3  4.3    Total Bilirubin 0.4  0.5  0.4    Alkaline Phosphatase 66  72  82    AST (SGOT) 35  28  33    ALT (SGPT) 33  26  33    WBC 8.77  11.78  10.73    Hemoglobin 15.1  16.2  15.6    Hematocrit 44.1  49.1  48.1    Platelets 352  332  354    Total Cholesterol 141      Triglycerides 56      HDL Cholesterol 36      LDL Cholesterol  93      PSA 1.710        Data reviewed : Recent hospitalization notes ED note from 2/28 and CT scans, labs recommendations               Assessment and Plan    Diagnoses and all orders for this visit:    1. Small bowel obstruction (Primary)  -     Ambulatory Referral to General Surgery  -     Ambulatory Referral to Gastroenterology    2. Ventral hernia with obstruction and without gangrene  -     Ambulatory Referral to General Surgery    3. Primary hypertension    4. Rash    5. Gastroesophageal reflux disease without esophagitis      Referral to general surgery to further evaluate patient's ventral hernia as it can be causing more obstruction and issues and related to other symptoms developing      Follow Up   Return in about 4 weeks (around 4/4/2024) for Next scheduled follow up, Recheck.  Patient was given instructions and counseling regarding his condition or for health maintenance advice. Please see specific information pulled into the AVS if appropriate.     Transcribed from ambient dictation for Daniel Faust DO by Daniel Faust DO.  03/07/24   13:28 EST

## 2024-03-11 LAB
QT INTERVAL: 382 MS
QTC INTERVAL: 478 MS

## 2024-03-13 ENCOUNTER — TELEPHONE (OUTPATIENT)
Dept: GASTROENTEROLOGY | Facility: CLINIC | Age: 64
End: 2024-03-13

## 2024-03-13 ENCOUNTER — OFFICE VISIT (OUTPATIENT)
Dept: GASTROENTEROLOGY | Facility: CLINIC | Age: 64
End: 2024-03-13
Payer: COMMERCIAL

## 2024-03-13 ENCOUNTER — HOSPITAL ENCOUNTER (OUTPATIENT)
Dept: CT IMAGING | Facility: HOSPITAL | Age: 64
Discharge: HOME | End: 2024-03-13
Payer: COMMERCIAL

## 2024-03-13 VITALS
BODY MASS INDEX: 36.37 KG/M2 | OXYGEN SATURATION: 100 % | HEART RATE: 79 BPM | HEIGHT: 68 IN | SYSTOLIC BLOOD PRESSURE: 129 MMHG | WEIGHT: 240 LBS | DIASTOLIC BLOOD PRESSURE: 88 MMHG

## 2024-03-13 DIAGNOSIS — R93.3 ABNORMAL CT SCAN, SMALL BOWEL: Primary | ICD-10-CM

## 2024-03-13 DIAGNOSIS — R10.13 EPIGASTRIC PAIN: ICD-10-CM

## 2024-03-13 DIAGNOSIS — R93.3 ABNORMAL CT SCAN, SMALL BOWEL: ICD-10-CM

## 2024-03-13 DIAGNOSIS — R12 HEARTBURN: ICD-10-CM

## 2024-03-13 DIAGNOSIS — K63.89 SMALL BOWEL MASS: ICD-10-CM

## 2024-03-13 PROCEDURE — 25510000001 IOPAMIDOL PER 1 ML

## 2024-03-13 PROCEDURE — 74177 CT ABD & PELVIS W/CONTRAST: CPT

## 2024-03-13 RX ORDER — PANTOPRAZOLE SODIUM 40 MG/1
40 TABLET, DELAYED RELEASE ORAL DAILY
Qty: 90 TABLET | Refills: 1 | Status: SHIPPED | OUTPATIENT
Start: 2024-03-13 | End: 2024-06-11

## 2024-03-13 RX ADMIN — IOPAMIDOL 100 ML: 755 INJECTION, SOLUTION INTRAVENOUS at 14:36

## 2024-03-13 NOTE — TELEPHONE ENCOUNTER
Caller: Jacky Rodriguez    Relationship to patient: Self    Best call back number: [phone]     Type of visit: ESOPHAGOGASTRODUODENOSCOPY     Additional notes: PATIENT HAS A PROCEDURE SCHEDULED WITH DR WILSON ON 3/22 THEY NEED TO CANCEL DUE TO INSURANCE.

## 2024-03-13 NOTE — PROGRESS NOTES
Chief Complaint  bowel obstruction, Heartburn, Hernia, and Nausea    Jacky Rodriguez is a 63 y.o. male who presents to [facility] on referral from Daniel Faust DO for a gastroenterology evaluation of ED follow-up..      History of Present Illness  New patient presents to the office for ED follow-up.  Patient at [facility] ED 2/28/2024 for elevated blood pressure, shortness of breath, and abdominal pain.  CT scan shows possible small bowel obstruction versus mass.  Patient prescribed pantoprazole 20 mg and discharged. He continues to have heartburn despite Protonix 20mg daily, this has helped some but not complete. Struggles with nausea with heartburn. Epigastric pain that is tender to touch. Patient does have a large ventral hernia and plans to proceed with consultation with general surgery, referral placed by PCP.  Denies vomiting and dysphasia.  Denies change in bowel habits, constipation, diarrhea, melena, and hematochezia.    CT abdomen/pelvis with contrast 2/28/2024 -mild hepatomegaly with diffuse hepatic steatosis, extensive sigmoid diverticulosis, 2.3 cm hyperdense circumscribed area in the mid small bowel of the left abdomen, mass not excluded, this could be developing transient small bowel intussusception but does not have the classic appearance as such and there are no prior studies for comparison    Colonoscopy 7/26/2017 by Dr. Stauffer -normal mucosa throughout with diverticula in the descending and sigmoid colon.  Repeat colonoscopy in 10 years.    Past Medical History:   Diagnosis Date    GERD (gastroesophageal reflux disease)     High blood pressure        Past Surgical History:   Procedure Laterality Date    COLONOSCOPY      PARTIAL HIP ARTHROPLASTY Bilateral          Current Outpatient Medications:     lisinopril-hydrochlorothiazide (PRINZIDE,ZESTORETIC) 20-12.5 MG per tablet, Take 1 tablet by mouth Daily., Disp: 90 tablet, Rfl: 3    metoprolol succinate XL (Toprol  "XL) 25 MG 24 hr tablet, Take 1 tablet by mouth Daily., Disp: 90 tablet, Rfl: 3    sildenafil (Viagra) 100 MG tablet, Take 1 tablet by mouth Daily As Needed for Erectile Dysfunction., Disp: 10 tablet, Rfl: 11    pantoprazole (Protonix) 40 MG EC tablet, Take 1 tablet by mouth Daily for 90 days., Disp: 90 tablet, Rfl: 1     Allergies   Allergen Reactions    Cefdinir Swelling       Family History   Problem Relation Age of Onset    Heart failure Mother     No Known Problems Father     Colon cancer Neg Hx         Social History     Social History Narrative    Not on file       Immunization:  Immunization History   Administered Date(s) Administered    Arexvy (RSV, Adults 60+ yrs) 09/28/2023    COVID-19 (MODERNA) 1st,2nd,3rd Dose Monovalent 03/30/2021, 04/29/2021, 12/17/2021    Flu Vaccine Split Quad 10/04/2016, 09/29/2017, 09/28/2018    Fluzone (or Fluarix & Flulaval for VFC) >6mos 10/04/2016, 09/29/2017, 11/15/2023    Tdap 05/01/2020        Objective     Vital Signs:   /88 (BP Location: Right arm, Patient Position: Sitting, Cuff Size: Adult)   Pulse 79   Ht 172.7 cm (67.99\")   Wt 109 kg (240 lb)   SpO2 100%   BMI 36.50 kg/m²       Physical Exam  Constitutional:       Appearance: Normal appearance. He is normal weight.   HENT:      Head: Normocephalic and atraumatic.      Nose: Nose normal.   Pulmonary:      Effort: Pulmonary effort is normal.   Skin:     General: Skin is warm and dry.   Neurological:      Mental Status: He is alert and oriented to person, place, and time. Mental status is at baseline.   Psychiatric:         Mood and Affect: Mood normal.         Behavior: Behavior normal.         Thought Content: Thought content normal.         Judgment: Judgment normal.         Result Review :     CBC w/diff          7/19/2023    08:06 10/9/2023    18:48 2/28/2024    14:44   CBC w/Diff   WBC 8.77  11.78  10.73    RBC 5.00  5.39  5.38    Hemoglobin 15.1  16.2  15.6    Hematocrit 44.1  49.1  48.1    MCV 88.2  " 91.1  89.4    MCH 30.2  30.1  29.0    MCHC 34.2  33.0  32.4    RDW 12.2  12.0  11.9    Platelets 352  332  354    Neutrophil Rel % 47.6  53.8  48.0    Immature Granulocyte Rel % 0.7  0.4  0.5    Lymphocyte Rel % 40.6  32.4  41.0    Monocyte Rel % 8.0  10.3  7.7    Eosinophil Rel % 2.2  2.3  2.0    Basophil Rel % 0.9  0.8  0.8      CMP          7/19/2023    08:06 10/9/2023    18:48 2/28/2024    14:44   CMP   Glucose 110  101  172    BUN 12  19  13    Creatinine 1.02  1.09  1.05    EGFR 83.6  77.2  80.3    Sodium 140  136  138    Potassium 4.0  4.1  3.9    Chloride 102  96  98    Calcium 9.8  10.1  9.8    Total Protein 7.5  8.4  8.1    Albumin 4.3  4.3  4.3    Globulin 3.2  4.1  3.8    Total Bilirubin 0.4  0.5  0.4    Alkaline Phosphatase 66  72  82    AST (SGOT) 35  28  33    ALT (SGPT) 33  26  33    Albumin/Globulin Ratio 1.3  1.0  1.1    BUN/Creatinine Ratio 11.8  17.4  12.4    Anion Gap 12.0  11.5  12.4                Assessment and Plan    Diagnoses and all orders for this visit:    1. Abnormal CT scan, small bowel (Primary)  -     CT Abdomen Pelvis With Contrast Enterography; Future  -     Case Request; Standing  -     Verify NPO; Standing  -     Obtain Informed Consent; Standing  -     Case Request    2. Epigastric pain  -     CT Abdomen Pelvis With Contrast Enterography; Future  -     Case Request; Standing  -     Verify NPO; Standing  -     Obtain Informed Consent; Standing  -     Case Request    3. Heartburn  -     Case Request; Standing  -     Verify NPO; Standing  -     Obtain Informed Consent; Standing  -     Case Request    Other orders  -     pantoprazole (Protonix) 40 MG EC tablet; Take 1 tablet by mouth Daily for 90 days.  Dispense: 90 tablet; Refill: 1      63-year-old new patient presents to the office for ED follow-up.  Patient at [facility] ED 2/28/2024 for elevated blood pressure, shortness of breath, and abdominal pain.  CT scan shows possible small bowel obstruction versus mass.  Patient prescribed  pantoprazole 20 mg and discharged.  Patient has had mild improvement in reflux since starting Protonix, I have increased dose to 40 mg daily.  Reviewed most recent CT scan and discussed concern for possible small bowel mass, patient will proceed with STAT CT enterography.  I have also advised patient proceed with EGD for further evaluation.  Denies cardiopulmonary history.  Patient is agreeable to plan call office any questions or concerns.    EGD Surgical Risk and Benefits: Possible risk/complications, benefits, and alternatives to surgical or invasive procedure have been explained to patient and/or legal guardian. Risks include bleeding, infection, and perforation. Patient has been evaluated and can tolerate anesthesia and/or sedation. Risk, benefits, and alternatives to anesthesia and sedation have been explained to patient and/or legal guardian.     Follow Up   No follow-ups on file.  Patient was given instructions and counseling regarding his condition or for health maintenance advice. Please see specific information pulled into the AVS if appropriate.

## 2024-03-14 ENCOUNTER — TELEPHONE (OUTPATIENT)
Dept: GASTROENTEROLOGY | Facility: CLINIC | Age: 64
End: 2024-03-14
Payer: COMMERCIAL

## 2024-03-14 NOTE — TELEPHONE ENCOUNTER
Spoke with Marimar Corbett and informed  of JAZMÍN Hampton result note and recommendations.  Encouraged and answered all questions.  Verified understanding.  Provided my direct number for any further questions.

## 2024-03-14 NOTE — TELEPHONE ENCOUNTER
Spoke to patient and informed of JAZMÍN Hampton result note and recommendations. Verified patient understanding.    Encouraged and answered all patient questions.  Patient requests that I review results with Marimar Corbett.  Patient does agree to referral at this time.  Advised I will reach out to Ms. Corbett and I will also go forward with the referral.  Patient verbalized understanding.    Will defer and reach out to patient for any follow up questions.

## 2024-03-14 NOTE — TELEPHONE ENCOUNTER
Reached out to Linda NAIR RN at Dr. Bower's office to inform of urgent referral.  (See referral documentation)

## 2024-03-14 NOTE — TELEPHONE ENCOUNTER
----- Message from JAZMÍN Guerrier sent at 3/14/2024  9:40 AM EDT -----  I have reviewed the patient's most recent CT enterography with Dr. Bower.  Discussed with patient at office yesterday that previous CT scan completed in ED had concern of mass especially since patient did not have obstructive issues at the time.  CT enterography completed yesterday does show 2.8 cm likely submucosal mass in the proximal jejunum which is in the small bowel.  Dr. Guerrero has recommended urgent referral to Dr. Bower for EGD/EUS with push to obtain biopsies at this location.    Okay to cancel case request for EGD with Dr. Guerrero

## 2024-03-15 ENCOUNTER — PATIENT ROUNDING (BHMG ONLY) (OUTPATIENT)
Dept: GASTROENTEROLOGY | Facility: CLINIC | Age: 64
End: 2024-03-15
Payer: COMMERCIAL

## 2024-03-15 NOTE — TELEPHONE ENCOUNTER
Reached out to patient to answer any questions that may arise.  Encouraged and answered all questions.    Patient states he is deferring to schedule his consultation with General Surgery for hernia repair until after he can determine plan of care regarding mass in colon.    Will follow referral.

## 2024-03-15 NOTE — PROGRESS NOTES
"3/15/2024      Hello, may I speak with Jacky Rodriguez     My name is Zoey. I am calling from [facility] [place]. I show that you had a recent visit with JAZMÍN Hampton.    Before we get started may I verify your date of birth? 1960    I am calling to officially welcome you to our practice and ask about your recent visit. Is this a good time to talk? Yes     Tell me about your visit with us. What things went well? \"It all went good, Esme was nice & answered all of questions and scheduled me for a CT scan\"    We strive to ensure that we protect your safety and privacy. Is there anything we could have done to improve this during your visit? No     We're always looking for ways to make our patients' experiences even better. Do you have recommendations on ways we may improve? No     Overall were you satisfied with your first visit to our practice? Yes     I appreciate you taking the time to speak with me today. Is there anything else I can do for you?  No     I am glad to hear that you had a very good visit and I appreciate you taking the time to provide feedback on this call. We would greatly appreciate you filling out a survey if you receive one in the mail, email or text. This is a great opportunity to provide any additional feedback that you may think of after this call as well.       Thank you, and have a great day.   "

## 2024-03-22 ENCOUNTER — HOSPITAL ENCOUNTER (EMERGENCY)
Facility: HOSPITAL | Age: 64
Discharge: HOME | End: 2024-03-22
Attending: EMERGENCY MEDICINE | Admitting: EMERGENCY MEDICINE
Payer: COMMERCIAL

## 2024-03-22 ENCOUNTER — APPOINTMENT (OUTPATIENT)
Dept: CT IMAGING | Facility: HOSPITAL | Age: 64
End: 2024-03-22
Payer: COMMERCIAL

## 2024-03-22 VITALS
WEIGHT: 238.98 LBS | HEART RATE: 111 BPM | TEMPERATURE: 97.8 F | HEIGHT: 68 IN | RESPIRATION RATE: 18 BRPM | OXYGEN SATURATION: 92 % | DIASTOLIC BLOOD PRESSURE: 98 MMHG | SYSTOLIC BLOOD PRESSURE: 130 MMHG | BODY MASS INDEX: 36.22 KG/M2

## 2024-03-22 DIAGNOSIS — R10.9 ABDOMINAL PAIN, UNSPECIFIED ABDOMINAL LOCATION: Primary | ICD-10-CM

## 2024-03-22 LAB
ALBUMIN SERPL-MCNC: 4.5 G/DL (ref 3.5–5.2)
ALBUMIN/GLOB SERPL: 1 G/DL
ALP SERPL-CCNC: 76 U/L (ref 39–117)
ALT SERPL W P-5'-P-CCNC: 44 U/L (ref 1–41)
ANION GAP SERPL CALCULATED.3IONS-SCNC: 13.1 MMOL/L (ref 5–15)
AST SERPL-CCNC: 43 U/L (ref 1–40)
BACTERIA UR QL AUTO: ABNORMAL /HPF
BASOPHILS # BLD AUTO: 0.05 10*3/MM3 (ref 0–0.2)
BASOPHILS NFR BLD AUTO: 0.3 % (ref 0–1.5)
BILIRUB SERPL-MCNC: 0.8 MG/DL (ref 0–1.2)
BILIRUB UR QL STRIP: NEGATIVE
BUN SERPL-MCNC: 20 MG/DL (ref 8–23)
BUN/CREAT SERPL: 15.2 (ref 7–25)
CALCIUM SPEC-SCNC: 9.9 MG/DL (ref 8.6–10.5)
CHLORIDE SERPL-SCNC: 100 MMOL/L (ref 98–107)
CLARITY UR: CLEAR
CO2 SERPL-SCNC: 20.9 MMOL/L (ref 22–29)
COLOR UR: YELLOW
CREAT SERPL-MCNC: 1.32 MG/DL (ref 0.76–1.27)
D-LACTATE SERPL-SCNC: 1.5 MMOL/L (ref 0.5–2)
D-LACTATE SERPL-SCNC: 2.6 MMOL/L (ref 0.5–2)
DEPRECATED RDW RBC AUTO: 40 FL (ref 37–54)
EGFRCR SERPLBLD CKD-EPI 2021: 61 ML/MIN/1.73
EOSINOPHIL # BLD AUTO: 0.07 10*3/MM3 (ref 0–0.4)
EOSINOPHIL NFR BLD AUTO: 0.5 % (ref 0.3–6.2)
ERYTHROCYTE [DISTWIDTH] IN BLOOD BY AUTOMATED COUNT: 12.1 % (ref 12.3–15.4)
GLOBULIN UR ELPH-MCNC: 4.4 GM/DL
GLUCOSE SERPL-MCNC: 193 MG/DL (ref 65–99)
GLUCOSE UR STRIP-MCNC: NEGATIVE MG/DL
HCT VFR BLD AUTO: 50.5 % (ref 37.5–51)
HGB BLD-MCNC: 16.5 G/DL (ref 13–17.7)
HGB UR QL STRIP.AUTO: NEGATIVE
HOLD SPECIMEN: NORMAL
HYALINE CASTS UR QL AUTO: ABNORMAL /LPF
IMM GRANULOCYTES # BLD AUTO: 0.04 10*3/MM3 (ref 0–0.05)
IMM GRANULOCYTES NFR BLD AUTO: 0.3 % (ref 0–0.5)
KETONES UR QL STRIP: NEGATIVE
LEUKOCYTE ESTERASE UR QL STRIP.AUTO: NEGATIVE
LYMPHOCYTES # BLD AUTO: 1.82 10*3/MM3 (ref 0.7–3.1)
LYMPHOCYTES NFR BLD AUTO: 12 % (ref 19.6–45.3)
MAGNESIUM SERPL-MCNC: 2.1 MG/DL (ref 1.6–2.4)
MCH RBC QN AUTO: 29.5 PG (ref 26.6–33)
MCHC RBC AUTO-ENTMCNC: 32.7 G/DL (ref 31.5–35.7)
MCV RBC AUTO: 90.3 FL (ref 79–97)
MONOCYTES # BLD AUTO: 0.83 10*3/MM3 (ref 0.1–0.9)
MONOCYTES NFR BLD AUTO: 5.5 % (ref 5–12)
NEUTROPHILS NFR BLD AUTO: 12.37 10*3/MM3 (ref 1.7–7)
NEUTROPHILS NFR BLD AUTO: 81.4 % (ref 42.7–76)
NITRITE UR QL STRIP: NEGATIVE
NRBC BLD AUTO-RTO: 0 /100 WBC (ref 0–0.2)
PH UR STRIP.AUTO: <=5 [PH] (ref 5–8)
PLATELET # BLD AUTO: 283 10*3/MM3 (ref 140–450)
PMV BLD AUTO: 10.7 FL (ref 6–12)
POTASSIUM SERPL-SCNC: 4.4 MMOL/L (ref 3.5–5.2)
PROT SERPL-MCNC: 8.9 G/DL (ref 6–8.5)
PROT UR QL STRIP: ABNORMAL
QT INTERVAL: 356 MS
QTC INTERVAL: 481 MS
RBC # BLD AUTO: 5.59 10*6/MM3 (ref 4.14–5.8)
RBC # UR STRIP: ABNORMAL /HPF
REF LAB TEST METHOD: ABNORMAL
SODIUM SERPL-SCNC: 134 MMOL/L (ref 136–145)
SP GR UR STRIP: >1.03 (ref 1–1.03)
SQUAMOUS #/AREA URNS HPF: ABNORMAL /HPF
TROPONIN T SERPL HS-MCNC: 9 NG/L
UROBILINOGEN UR QL STRIP: ABNORMAL
WBC # UR STRIP: ABNORMAL /HPF
WBC CASTS #/AREA URNS LPF: ABNORMAL /LPF
WBC NRBC COR # BLD AUTO: 15.18 10*3/MM3 (ref 3.4–10.8)
WHOLE BLOOD HOLD COAG: NORMAL
WHOLE BLOOD HOLD COAG: NORMAL
WHOLE BLOOD HOLD SPECIMEN: NORMAL
WHOLE BLOOD HOLD SPECIMEN: NORMAL

## 2024-03-22 PROCEDURE — 81001 URINALYSIS AUTO W/SCOPE: CPT | Performed by: EMERGENCY MEDICINE

## 2024-03-22 PROCEDURE — 84484 ASSAY OF TROPONIN QUANT: CPT | Performed by: EMERGENCY MEDICINE

## 2024-03-22 PROCEDURE — 93005 ELECTROCARDIOGRAM TRACING: CPT

## 2024-03-22 PROCEDURE — 96374 THER/PROPH/DIAG INJ IV PUSH: CPT

## 2024-03-22 PROCEDURE — 96376 TX/PRO/DX INJ SAME DRUG ADON: CPT

## 2024-03-22 PROCEDURE — 25010000002 MORPHINE PER 10 MG: Performed by: EMERGENCY MEDICINE

## 2024-03-22 PROCEDURE — 83735 ASSAY OF MAGNESIUM: CPT | Performed by: EMERGENCY MEDICINE

## 2024-03-22 PROCEDURE — 96375 TX/PRO/DX INJ NEW DRUG ADDON: CPT

## 2024-03-22 PROCEDURE — 93005 ELECTROCARDIOGRAM TRACING: CPT | Performed by: EMERGENCY MEDICINE

## 2024-03-22 PROCEDURE — 80053 COMPREHEN METABOLIC PANEL: CPT | Performed by: EMERGENCY MEDICINE

## 2024-03-22 PROCEDURE — 25810000003 SODIUM CHLORIDE 0.9 % SOLUTION: Performed by: EMERGENCY MEDICINE

## 2024-03-22 PROCEDURE — 25010000002 ONDANSETRON PER 1 MG: Performed by: EMERGENCY MEDICINE

## 2024-03-22 PROCEDURE — 85025 COMPLETE CBC W/AUTO DIFF WBC: CPT

## 2024-03-22 PROCEDURE — 74177 CT ABD & PELVIS W/CONTRAST: CPT

## 2024-03-22 PROCEDURE — 87040 BLOOD CULTURE FOR BACTERIA: CPT | Performed by: EMERGENCY MEDICINE

## 2024-03-22 PROCEDURE — 36415 COLL VENOUS BLD VENIPUNCTURE: CPT | Performed by: EMERGENCY MEDICINE

## 2024-03-22 PROCEDURE — 83605 ASSAY OF LACTIC ACID: CPT | Performed by: EMERGENCY MEDICINE

## 2024-03-22 PROCEDURE — 99285 EMERGENCY DEPT VISIT HI MDM: CPT

## 2024-03-22 PROCEDURE — 25510000001 IOPAMIDOL PER 1 ML: Performed by: EMERGENCY MEDICINE

## 2024-03-22 RX ORDER — ONDANSETRON 2 MG/ML
4 INJECTION INTRAMUSCULAR; INTRAVENOUS ONCE
Status: COMPLETED | OUTPATIENT
Start: 2024-03-22 | End: 2024-03-22

## 2024-03-22 RX ORDER — OXYCODONE HYDROCHLORIDE AND ACETAMINOPHEN 5; 325 MG/1; MG/1
1 TABLET ORAL EVERY 6 HOURS PRN
Qty: 12 TABLET | Refills: 0 | Status: SHIPPED | OUTPATIENT
Start: 2024-03-22

## 2024-03-22 RX ORDER — ONDANSETRON 4 MG/1
4 TABLET, ORALLY DISINTEGRATING ORAL EVERY 8 HOURS PRN
Qty: 15 TABLET | Refills: 0 | Status: SHIPPED | OUTPATIENT
Start: 2024-03-22

## 2024-03-22 RX ORDER — SODIUM CHLORIDE 0.9 % (FLUSH) 0.9 %
10 SYRINGE (ML) INJECTION AS NEEDED
Status: DISCONTINUED | OUTPATIENT
Start: 2024-03-22 | End: 2024-03-22 | Stop reason: HOSPADM

## 2024-03-22 RX ORDER — OXYCODONE HYDROCHLORIDE AND ACETAMINOPHEN 5; 325 MG/1; MG/1
1 TABLET ORAL ONCE
Status: COMPLETED | OUTPATIENT
Start: 2024-03-22 | End: 2024-03-22

## 2024-03-22 RX ADMIN — ONDANSETRON 4 MG: 2 INJECTION INTRAMUSCULAR; INTRAVENOUS at 14:40

## 2024-03-22 RX ADMIN — MORPHINE SULFATE 4 MG: 4 INJECTION, SOLUTION INTRAMUSCULAR; INTRAVENOUS at 14:40

## 2024-03-22 RX ADMIN — OXYCODONE AND ACETAMINOPHEN 1 TABLET: 5; 325 TABLET ORAL at 19:20

## 2024-03-22 RX ADMIN — ONDANSETRON 4 MG: 2 INJECTION INTRAMUSCULAR; INTRAVENOUS at 19:20

## 2024-03-22 RX ADMIN — ONDANSETRON 4 MG: 2 INJECTION INTRAMUSCULAR; INTRAVENOUS at 17:36

## 2024-03-22 RX ADMIN — SODIUM CHLORIDE 1000 ML: 9 INJECTION, SOLUTION INTRAVENOUS at 14:39

## 2024-03-22 RX ADMIN — IOPAMIDOL 100 ML: 755 INJECTION, SOLUTION INTRAVENOUS at 15:40

## 2024-03-22 NOTE — ED NOTES
250 ML NS, 4MG ZOFRAN.  HX of hypertension  patient reportedly has n/v/d since 0100 this am, patient reportedly had syncopal episode after using the bathroom. family heard fall, came to check on patient, found patient down. no head injury unsure if hit his head. no thinners.  Reports pain to left side.  Has hernia, spoke with pcp around noon who is concerned for obstruction.  sugar 209  tachycardic

## 2024-03-22 NOTE — ED PROVIDER NOTES
Time: 2:24 PM EDT  Date of encounter:  3/22/2024  Independent Historian/Clinical History and Information was obtained by:   Patient    History is limited by: N/A    Chief Complaint: Vomiting      History of Present Illness:  Patient is a 63 y.o. year old male who presents to the emergency department for evaluation of vomiting and syncope that started earlier today.  Patient also reports abdominal pain.  Patient has no chest pain or shortness of breath.  Patient states that he did have an episode of diarrhea.  Patient denies dysuria urinary frequency.    HPI    Patient Care Team  Primary Care Provider: Daniel Faust DO    Past Medical History:     Allergies   Allergen Reactions    Cefdinir Swelling     Past Medical History:   Diagnosis Date    GERD (gastroesophageal reflux disease)     High blood pressure      Past Surgical History:   Procedure Laterality Date    COLONOSCOPY      PARTIAL HIP ARTHROPLASTY Bilateral      Family History   Problem Relation Age of Onset    Heart failure Mother     No Known Problems Father     Colon cancer Neg Hx        Home Medications:  Prior to Admission medications    Medication Sig Start Date End Date Taking? Authorizing Provider   lisinopril-hydrochlorothiazide (PRINZIDE,ZESTORETIC) 20-12.5 MG per tablet Take 1 tablet by mouth Daily. 11/15/23   Daniel Faust DO   metoprolol succinate XL (Toprol XL) 25 MG 24 hr tablet Take 1 tablet by mouth Daily. 11/15/23   Daniel Faust DO   pantoprazole (Protonix) 40 MG EC tablet Take 1 tablet by mouth Daily for 90 days. 3/13/24 6/11/24  Esme Kirkland APRN   sildenafil (Viagra) 100 MG tablet Take 1 tablet by mouth Daily As Needed for Erectile Dysfunction. 7/19/23   Daniel Faust DO        Social History:   Social History     Tobacco Use    Smoking status: Never     Passive exposure: Never    Smokeless tobacco: Never   Vaping Use    Vaping status: Never Used   Substance Use Topics    Alcohol use: Not Currently     Comment: occ    Drug use:  "Never         Review of Systems:  Review of Systems   Constitutional:  Negative for chills and fever.   HENT:  Negative for congestion, rhinorrhea and sore throat.    Eyes:  Negative for pain and visual disturbance.   Respiratory:  Negative for apnea, cough, chest tightness and shortness of breath.    Cardiovascular:  Negative for chest pain and palpitations.   Gastrointestinal:  Positive for abdominal pain. Negative for diarrhea, nausea and vomiting.   Genitourinary:  Negative for difficulty urinating and dysuria.   Musculoskeletal:  Negative for joint swelling and myalgias.   Skin:  Negative for color change.   Neurological:  Negative for seizures and headaches.   Psychiatric/Behavioral: Negative.     All other systems reviewed and are negative.       Physical Exam:  /98   Pulse 111   Temp 97.8 °F (36.6 °C) (Oral)   Resp 18   Ht 172.2 cm (67.8\")   Wt 108 kg (238 lb 15.7 oz)   SpO2 92%   BMI 36.56 kg/m²     Physical Exam  Vitals and nursing note reviewed.   Constitutional:       General: He is not in acute distress.     Appearance: Normal appearance. He is not toxic-appearing.   HENT:      Head: Normocephalic and atraumatic.      Jaw: There is normal jaw occlusion.   Eyes:      General: Lids are normal.      Extraocular Movements: Extraocular movements intact.      Conjunctiva/sclera: Conjunctivae normal.      Pupils: Pupils are equal, round, and reactive to light.   Cardiovascular:      Rate and Rhythm: Normal rate and regular rhythm.      Pulses: Normal pulses.      Heart sounds: Normal heart sounds.   Pulmonary:      Effort: Pulmonary effort is normal. No respiratory distress.      Breath sounds: Normal breath sounds. No wheezing or rhonchi.   Abdominal:      General: Abdomen is flat.      Palpations: Abdomen is soft.      Tenderness: There is abdominal tenderness. There is no guarding or rebound.   Musculoskeletal:         General: Normal range of motion.      Cervical back: Normal range of motion " and neck supple.      Right lower leg: No edema.      Left lower leg: No edema.   Skin:     General: Skin is warm and dry.   Neurological:      Mental Status: He is alert and oriented to person, place, and time. Mental status is at baseline.   Psychiatric:         Mood and Affect: Mood normal.                  Procedures:  Procedures      Medical Decision Making:      Comorbidities that affect care:    Hypertension    External Notes reviewed:    Previous ED Note: Patient was last seen in the emergency department for shortness of breath and hypertension      The following orders were placed and all results were independently analyzed by me:  Orders Placed This Encounter   Procedures    Blood Culture - Blood,    Blood Culture - Blood,    CT Abdomen Pelvis With Contrast    [place] Draw    Comprehensive Metabolic Panel    Magnesium    Single High Sensitivity Troponin T    CBC Auto Differential    Lactic Acid, Plasma    [place] Draw    STAT Lactic Acid, Reflex    Urinalysis With Microscopic If Indicated (No Culture) - Urine, Clean Catch    Urinalysis, Microscopic Only - Urine, Clean Catch    NPO Diet NPO Type: Strict NPO    Undress & Gown    Vital Signs    Orthostatic Blood Pressure    Undress & Gown    Continuous Pulse Oximetry    Vital Signs    General MD Inpatient Consult    Oxygen Therapy- Nasal Cannula; Titrate 1-6 LPM Per SpO2; 90 - 95%    Oxygen Therapy- Nasal Cannula; Titrate 1-6 LPM Per SpO2; 90 - 95%    POC Glucose Once    ECG 12 Lead ED Triage Standing Order; Syncope    Insert Peripheral IV    Insert Peripheral IV    CBC & Differential    Green Top (Gel)    Lavender Top    Gold Top - SST    Light Blue Top    Green Top (Gel)    Lavender Top    Gold Top - SST    Light Blue Top       Medications Given in the Emergency Department:  Medications   sodium chloride 0.9 % flush 10 mL (has no administration in time range)   sodium chloride 0.9 % flush 10 mL (has no administration in time range)   sodium chloride 0.9 %  bolus 1,000 mL (0 mL Intravenous Stopped 3/22/24 1734)   morphine injection 4 mg (4 mg Intravenous Given 3/22/24 1440)   ondansetron (ZOFRAN) injection 4 mg (4 mg Intravenous Given 3/22/24 1440)   iopamidol (ISOVUE-370) 76 % injection 100 mL (100 mL Intravenous Given 3/22/24 1540)   ondansetron (ZOFRAN) injection 4 mg (4 mg Intravenous Given 3/22/24 1736)   oxyCODONE-acetaminophen (PERCOCET) 5-325 MG per tablet 1 tablet (1 tablet Oral Given 3/22/24 1920)   ondansetron (ZOFRAN) injection 4 mg (4 mg Intravenous Given 3/22/24 1920)        ED Course:         Labs:    Lab Results (last 24 hours)       Procedure Component Value Units Date/Time    CBC & Differential [[id number]]  (Abnormal) Collected: 03/22/24 1331    Specimen: Blood from Arm, Left Updated: 03/22/24 1341    Narrative:      The following orders were created for panel order CBC & Differential.  Procedure                               Abnormality         Status                     ---------                               -----------         ------                     CBC Auto Differential[[id number]]        Abnormal            Final result                 Please view results for these tests on the individual orders.    Comprehensive Metabolic Panel [[id number]]  (Abnormal) Collected: 03/22/24 1331    Specimen: Blood from Arm, Left Updated: 03/22/24 1400     Glucose 193 mg/dL      BUN 20 mg/dL      Creatinine 1.32 mg/dL      Sodium 134 mmol/L      Potassium 4.4 mmol/L      Chloride 100 mmol/L      CO2 20.9 mmol/L      Calcium 9.9 mg/dL      Total Protein 8.9 g/dL      Albumin 4.5 g/dL      ALT (SGPT) 44 U/L      AST (SGOT) 43 U/L      Alkaline Phosphatase 76 U/L      Total Bilirubin 0.8 mg/dL      Globulin 4.4 gm/dL      A/G Ratio 1.0 g/dL      BUN/Creatinine Ratio 15.2     Anion Gap 13.1 mmol/L      eGFR 61.0 mL/min/1.73     Narrative:      GFR Normal >60  Chronic Kidney Disease <60  Kidney Failure <15      Magnesium [[id number]]  (Normal) Collected: 03/22/24 1331     Specimen: Blood from Arm, Left Updated: 03/22/24 1400     Magnesium 2.1 mg/dL     Single High Sensitivity Troponin T [[id number]]  (Normal) Collected: 03/22/24 1331    Specimen: Blood from Arm, Left Updated: 03/22/24 1400     HS Troponin T 9 ng/L     Narrative:      High Sensitive Troponin T Reference Range:  <14.0 ng/L- Negative Female for AMI  <22.0 ng/L- Negative Male for AMI  >=14 - Abnormal Female indicating possible myocardial injury.  >=22 - Abnormal Male indicating possible myocardial injury.   Clinicians would have to utilize clinical acumen, EKG, Troponin, and serial changes to determine if it is an Acute Myocardial Infarction or myocardial injury due to an underlying chronic condition.         CBC Auto Differential [[id number]]  (Abnormal) Collected: 03/22/24 1331    Specimen: Blood from Arm, Left Updated: 03/22/24 1341     WBC 15.18 10*3/mm3      RBC 5.59 10*6/mm3      Hemoglobin 16.5 g/dL      Hematocrit 50.5 %      MCV 90.3 fL      MCH 29.5 pg      MCHC 32.7 g/dL      RDW 12.1 %      RDW-SD 40.0 fl      MPV 10.7 fL      Platelets 283 10*3/mm3      Neutrophil % 81.4 %      Lymphocyte % 12.0 %      Monocyte % 5.5 %      Eosinophil % 0.5 %      Basophil % 0.3 %      Immature Grans % 0.3 %      Neutrophils, Absolute 12.37 10*3/mm3      Lymphocytes, Absolute 1.82 10*3/mm3      Monocytes, Absolute 0.83 10*3/mm3      Eosinophils, Absolute 0.07 10*3/mm3      Basophils, Absolute 0.05 10*3/mm3      Immature Grans, Absolute 0.04 10*3/mm3      nRBC 0.0 /100 WBC     Lactic Acid, Plasma [[id number]]  (Abnormal) Collected: 03/22/24 1437    Specimen: Blood from Hand, Right Updated: 03/22/24 1525     Lactate 2.6 mmol/L     Blood Culture - Blood, Arm, Right [[id number]] Collected: 03/22/24 1437    Specimen: Blood from Arm, Right Updated: 03/22/24 1450    Blood Culture - Blood, Hand, Right [[id number]] Collected: 03/22/24 1437    Specimen: Blood from Hand, Right Updated: 03/22/24 1451    Urinalysis With Microscopic If  Indicated (No Culture) - Urine, Clean Catch [[id number]]  (Abnormal) Collected: 03/22/24 1727    Specimen: Urine, Clean Catch Updated: 03/22/24 1806     Color, UA Yellow     Appearance, UA Clear     pH, UA <=5.0     Specific Gravity, UA >1.030     Glucose, UA Negative     Ketones, UA Negative     Bilirubin, UA Negative     Blood, UA Negative     Protein, UA 30 mg/dL (1+)     Leuk Esterase, UA Negative     Nitrite, UA Negative     Urobilinogen, UA 0.2 E.U./dL    Urinalysis, Microscopic Only - Urine, Clean Catch [[id number]]  (Abnormal) Collected: 03/22/24 1727    Specimen: Urine, Clean Catch Updated: 03/22/24 1806     RBC, UA 0-2 /HPF      WBC, UA 3-5 /HPF      Bacteria, UA None Seen /HPF      Squamous Epithelial Cells, UA 0-2 /HPF      Hyaline Casts, UA 3-6 /LPF      WBC Casts 0-2 /LPF      Methodology Manual Light Microscopy    STAT Lactic Acid, Reflex [[id number]]  (Normal) Collected: 03/22/24 1737    Specimen: Blood Updated: 03/22/24 1847     Lactate 1.5 mmol/L              Imaging:    CT Abdomen Pelvis With Contrast    Result Date: 3/22/2024  EXAM: CT ABDOMEN PELVIS W CONTRAST-  DATE OF EXAM: 3/22/2024 3:40 PM  INDICATION: abdominal pain. Vomiting  COMPARISON: CT abdomen and pelvis dated 3/13/2024  TECHNIQUE: Contiguous axial CT images were obtained from the lung bases to the pubic symphysis obtained following the uneventful intravenous administration of 100 mL of Isovue 370 contrast. Sagittal and coronal reconstructions were performed.  Automated exposure control and iterative reconstruction methods were used.  FINDINGS: The heart size is normal. There is no pericardial effusion. The lung bases are clear. There is prominent extrapleural fat posteriorly.  The liver is normal in size. There is diffuse hepatic steatosis with decreased density. There is focal fatty sparing along the gallbladder fossa. The portal vein and hepatic veins are patent. The gallbladder is present without wall thickening. There is no  intrahepatic or extrahepatic biliary ductal dilatation.  The spleen is normal in size. The adrenal glands and pancreas appear within normal limits.  The kidneys are symmetric in size and enhancement. There is no hydronephrosis or hydroureter. The urinary bladder is collapsed which limits evaluation. The prostate is normal in size.  The stomach and duodenum are normal in caliber and configuration. There are no abnormally dilated loops of small bowel to suggest small bowel obstruction or small bowel inflammation. The appendix is visualized and normal within the right lower quadrant. There is descending and sigmoid diverticulosis without acute diverticulitis.  The aorta is normal in caliber without evidence of aneurysm formation. There is no mesenteric, retroperitoneal, or pelvic lymphadenopathy by size criteria. 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 There is multilevel degenerative disc disease of the thoracic spine. There is bridging paravertebral ossification compatible with diffuse idiopathic skeletal hyperostosis.      1. No evidence of bowel obstruction or inflammation. 2. Diffuse hepatic steatosis. 3. Sigmoid diverticulosis without acute diverticulitis.      Electronically Signed By-Mahesh Alexander MD On:3/22/2024 3:51 PM         Differential Diagnosis and Discussion:    Abdominal Pain: Based on the patient's signs and symptoms, I considered abdominal aortic aneurysm, small bowel obstruction, pancreatitis, acute cholecystitis, acute appendecitis, peptic ulcer disease, gastritis, colitis, endocrine disorders, irritable bowel syndrome and other differential diagnosis an etiology of the patient's abdominal pain.    All labs were reviewed and interpreted by me.  CT scan radiology impression was interpreted by me.    MDM     Amount and/or Complexity of Data Reviewed  Clinical lab tests: reviewed  Tests in the radiology section of CPT®: reviewed  Tests in the medicine section of CPT®: reviewed    The patient´s  CBC that was reviewed and interpreted by me shows no abnormalities of critical concern. Of note, there is no anemia requiring a blood transfusion and the platelet count is acceptable.  The patient´s CMP that was reviewed and interpretted by me shows no abnormalities of critical concern. Of note, the patient´s sodium and potassium are acceptable. The patient´s liver enzymes are unremarkable. The patient´s renal function (creatinine) is preserved. The patient has a normal anion gap.  CT scan is negative for acute intra-abdominal pathology.            Patient Care Considerations:    ANTIBIOTICS: I considered prescribing antibiotics as an outpatient however no bacterial focus of infection was found.      Consultants/Shared Management Plan:    Case was discussed with Dr. Mota who recommends transfer if the patient's pain is not under control.  I then discussed the case with GI at [facility] who is agreed with transfer if the patient would like to be transferred.  I then relayed this to the patient who is requesting discharge and states that he does want to go home and follow-up with Dr. Bower.    Social Determinants of Health:    Patient is independent, reliable, and has access to care.       Disposition and Care Coordination:    Discharged: I considered escalation of care by admitting this patient to the hospital, however patient is requesting discharge.    I have explained the patient´s condition, diagnoses and treatment plan based on the information available to me at this time. I have answered questions and addressed any concerns. The patient has a good  understanding of the patient´s diagnosis, condition, and treatment plan as can be expected at this point. The vital signs have been stable. The patient´s condition is stable and appropriate for discharge from the emergency department.      The patient will pursue further outpatient evaluation with the primary care physician or other designated or consulting physician as  outlined in the discharge instructions. They are agreeable to this plan of care and follow-up instructions have been explained in detail. The patient has received these instructions in written format and has expressed an understanding of the discharge instructions. The patient is aware that any significant change in condition or worsening of symptoms should prompt an immediate return to this or the closest emergency department or call to 911.  I have explained discharge medications and the need for follow up with the patient/caretakers. This was also printed in the discharge instructions. Patient was discharged with the following medications and follow up:      Medication List        New Prescriptions      ondansetron ODT 4 MG disintegrating tablet  Commonly known as: ZOFRAN-ODT  Place 1 tablet on the tongue Every 8 (Eight) Hours As Needed for Nausea or Vomiting.     oxyCODONE-acetaminophen 5-325 MG per tablet  Commonly known as: PERCOCET  Take 1 tablet by mouth Every 6 (Six) Hours As Needed for Severe Pain.               Where to Get Your Medications        These medications were sent to [place], [place] - [address] [facility] - [phone] [facility] [phone]   [address] [id number]      Phone: [phone]   ondansetron ODT 4 MG disintegrating tablet  oxyCODONE-acetaminophen 5-325 MG per tablet      Daniel Faust, DO  [address]   [place] [id number] [phone]    In 2 days      Follow-up with Dr. Bower at your scheduled appointment.             Final diagnoses:   Abdominal pain, unspecified abdominal location        ED Disposition       ED Disposition   Discharge    Condition   Stable    Comment   --               This medical record created using voice recognition software.             Carolina Tejeda MD  03/22/24 2024

## 2024-03-27 LAB
BACTERIA SPEC AEROBE CULT: NORMAL
BACTERIA SPEC AEROBE CULT: NORMAL

## 2024-04-03 ENCOUNTER — TELEPHONE (OUTPATIENT)
Dept: GASTROENTEROLOGY | Facility: CLINIC | Age: 64
End: 2024-04-03
Payer: COMMERCIAL

## 2024-04-03 NOTE — TELEPHONE ENCOUNTER
Spoke with patient.  Patient states he has an appointment next week with a Dr. Lopez in [place].  Advised to reach out to the office if we could be of any further help.  Patient verbalized understanding.

## 2024-04-03 NOTE — TELEPHONE ENCOUNTER
----- Message from JAZMÍN Guerrier sent at 4/2/2024 12:53 PM EDT -----  I have reviewed the patient's most recent EGD/EUS performed by Dr. Bower with no lesion found.  There is concern for extraluminal mass versus inability to advance endoscope far enough to reach previous mass noted on imaging.  Referrals placed for Dr. Bower to surgical oncologist for further work up. Please ensure patient has been scheduled.

## 2024-04-09 ENCOUNTER — TELEPHONE (OUTPATIENT)
Dept: CARDIOLOGY | Facility: CLINIC | Age: 64
End: 2024-04-09

## 2024-04-10 ENCOUNTER — OFFICE VISIT (OUTPATIENT)
Dept: CARDIOLOGY | Facility: CLINIC | Age: 64
End: 2024-04-10
Payer: COMMERCIAL

## 2024-04-10 VITALS
HEART RATE: 81 BPM | WEIGHT: 248.2 LBS | BODY MASS INDEX: 37.62 KG/M2 | SYSTOLIC BLOOD PRESSURE: 116 MMHG | DIASTOLIC BLOOD PRESSURE: 77 MMHG | HEIGHT: 68 IN

## 2024-04-10 DIAGNOSIS — I10 ESSENTIAL HYPERTENSION: ICD-10-CM

## 2024-04-10 DIAGNOSIS — R06.09 DYSPNEA ON EXERTION: Primary | ICD-10-CM

## 2024-04-10 DIAGNOSIS — R55 SYNCOPE AND COLLAPSE: ICD-10-CM

## 2024-04-10 DIAGNOSIS — Z01.810 PRE-OPERATIVE CARDIOVASCULAR EXAMINATION: ICD-10-CM

## 2024-04-10 NOTE — PROGRESS NOTES
Chief Complaint  Hypertension and Follow-up (F/U, edis Morris RN, for surgery clearance. )    Subjective        History of Present Illness  Jacky Rodriguez presents to [facility] CARDIOLOGY for follow up.  Patient is a 63-year-old male with past medical history outlined below, significant for hypertension who presents for follow-up.  He has had a couple of syncopal episodes.  His first syncopal episode he attributes to receiving a vaccination and passed out a couple days afterwards.  Most recently he passed out after having a few days of diarrhea and attributes it to dehydration.  Apart from that he feels good.  He denies chest pain.  He has shortness of breath which is chronic and unchanged.  He denies any dizziness, lightheadedness, edema or palpitations.      Past Medical History:   Diagnosis Date    GERD (gastroesophageal reflux disease)     High blood pressure        ALLERGY  Allergies   Allergen Reactions    Cefdinir Swelling        Past Surgical History:   Procedure Laterality Date    COLONOSCOPY      PARTIAL HIP ARTHROPLASTY Bilateral         Social History     Socioeconomic History    Marital status:    Tobacco Use    Smoking status: Never     Passive exposure: Never    Smokeless tobacco: Never   Vaping Use    Vaping status: Never Used   Substance and Sexual Activity    Alcohol use: Not Currently     Comment: occ    Drug use: Never    Sexual activity: Defer       Family History   Problem Relation Age of Onset    Heart failure Mother     No Known Problems Father     Colon cancer Neg Hx         Current Outpatient Medications on File Prior to Visit   Medication Sig    lisinopril-hydrochlorothiazide (PRINZIDE,ZESTORETIC) 20-12.5 MG per tablet Take 1 tablet by mouth Daily.    metoprolol succinate XL (Toprol XL) 25 MG 24 hr tablet Take 1 tablet by mouth Daily.    ondansetron ODT (ZOFRAN-ODT) 4 MG disintegrating tablet Place 1 tablet on the tongue Every 8 (Eight) Hours As Needed for Nausea  "or Vomiting.    oxyCODONE-acetaminophen (PERCOCET) 5-325 MG per tablet Take 1 tablet by mouth Every 6 (Six) Hours As Needed for Severe Pain.    pantoprazole (Protonix) 40 MG EC tablet Take 1 tablet by mouth Daily for 90 days.    sildenafil (Viagra) 100 MG tablet Take 1 tablet by mouth Daily As Needed for Erectile Dysfunction.     No current facility-administered medications on file prior to visit.       Objective   Vitals:    04/10/24 0923   BP: 116/77   Pulse: 81   Weight: 113 kg (248 lb 3.2 oz)   Height: 172.2 cm (67.8\")       Physical Exam  Constitutional:       General: He is awake. He is not in acute distress.     Appearance: Normal appearance.   HENT:      Head: Normocephalic.      Nose: Nose normal. No congestion.   Eyes:      Extraocular Movements: Extraocular movements intact.      Conjunctiva/sclera: Conjunctivae normal.      Pupils: Pupils are equal, round, and reactive to light.   Neck:      Thyroid: No thyromegaly.      Vascular: No JVD.   Cardiovascular:      Rate and Rhythm: Normal rate and regular rhythm.      Chest Wall: PMI is not displaced.      Pulses: Normal pulses.      Heart sounds: Normal heart sounds, S1 normal and S2 normal. No murmur heard.     No friction rub. No gallop. No S3 or S4 sounds.   Pulmonary:      Effort: Pulmonary effort is normal.      Breath sounds: Normal breath sounds. No wheezing, rhonchi or rales.   Abdominal:      General: Bowel sounds are normal.      Palpations: Abdomen is soft.      Tenderness: There is no abdominal tenderness.   Musculoskeletal:      Cervical back: No tenderness.      Right lower leg: No edema.      Left lower leg: No edema.   Lymphadenopathy:      Cervical: No cervical adenopathy.   Skin:     General: Skin is warm and dry.      Capillary Refill: Capillary refill takes less than 2 seconds.      Coloration: Skin is not cyanotic.      Findings: No petechiae or rash.      Nails: There is no clubbing.   Neurological:      Mental Status: He is alert. "   Psychiatric:         Mood and Affect: Mood normal.         Behavior: Behavior is cooperative.           Result Review     The following data was reviewed by JAZMÍN Kendall on 04/10/24.      CMP          10/9/2023    18:48 2/28/2024    14:44 3/22/2024    13:31   CMP   Glucose 101  172  193    BUN 19  13  20    Creatinine 1.09  1.05  1.32    EGFR 77.2  80.3  61.0    Sodium 136  138  134    Potassium 4.1  3.9  4.4    Chloride 96  98  100    Calcium 10.1  9.8  9.9    Total Protein 8.4  8.1  8.9    Albumin 4.3  4.3  4.5    Globulin 4.1  3.8  4.4    Total Bilirubin 0.5  0.4  0.8    Alkaline Phosphatase 72  82  76    AST (SGOT) 28  33  43    ALT (SGPT) 26  33  44    Albumin/Globulin Ratio 1.0  1.1  1.0    BUN/Creatinine Ratio 17.4  12.4  15.2    Anion Gap 11.5  12.4  13.1      CBC w/diff          10/9/2023    18:48 2/28/2024    14:44 3/22/2024    13:31   CBC w/Diff   WBC 11.78  10.73  15.18    RBC 5.39  5.38  5.59    Hemoglobin 16.2  15.6  16.5    Hematocrit 49.1  48.1  50.5    MCV 91.1  89.4  90.3    MCH 30.1  29.0  29.5    MCHC 33.0  32.4  32.7    RDW 12.0  11.9  12.1    Platelets 332  354  283    Neutrophil Rel % 53.8  48.0  81.4    Immature Granulocyte Rel % 0.4  0.5  0.3    Lymphocyte Rel % 32.4  41.0  12.0    Monocyte Rel % 10.3  7.7  5.5    Eosinophil Rel % 2.3  2.0  0.5    Basophil Rel % 0.8  0.8  0.3       Lipid Panel          7/19/2023    08:06   Lipid Panel   Total Cholesterol 141    Triglycerides 56    HDL Cholesterol 36    VLDL Cholesterol 12    LDL Cholesterol  93    LDL/HDL Ratio 2.61        Results for orders placed in visit on 10/30/23    Adult Transthoracic Echo Complete W/ Cont if Necessary Per Protocol    Interpretation Summary    Left ventricular systolic function is normal. Calculated left ventricular EF = 59.5%    Left ventricular diastolic function is consistent with (grade I) impaired relaxation.    No hemodynamically significant valvular pathology.      === Results for orders placed in  visit on 10/19/23 ===    CARDIAC EVENT MONITOR    - Interpretation Summary -  Baseline rhythm is sinus with an average heart rate of 89 bpm.  No significant bradycardia, pauses or AV block were noted.  Rare isolated PACs.  Occasional isolated PVCs.  No sustained arrhythmias were noted.  3 events were triggered by the patient; 2 correlated with normal sinus rhythm and 1 correlated with sinus rhythm with PVC.  Study is overall benign.          Procedures    Assessment & Plan  Diagnoses and all orders for this visit:    1. Dyspnea on exertion (Primary)  -     Treadmill Stress Test; Future    2. Pre-operative cardiovascular examination  -     Treadmill Stress Test; Future    3. Syncope and collapse    4. Essential hypertension    1.  Patient has dyspnea on exertion which has been stable for some time.  Treadmill stress test was previously scheduled but not completed.  He was advised to have this testing completed.  He had a recent Holter monitor that was benign.  Recent echocardiogram was unremarkable.  Patient was reassured.  Treadmill stress test is pending.  2.  Patient has upcoming abdominal surgery for removal of a mass in his intestine.  Treadmill stress test to be done to rule out myocardial ischemia prior to surgery.  3.  Patient has had 2 syncopal episodes over the past year.  These are described in HPI.  Recent Holter monitor was benign.  Recommended loop recorder implantation however patient wants to hold off on this for now.  Will continue to monitor clinically.  4.  Stable on current regimen.  Continue the same.          The medical services provided during this encounter are part of ongoing care related to this patient's single serious condition or complex condition.        Follow Up   No follow-ups on file.    Patient was given instructions and counseling regarding his condition or for health maintenance advice. Please see specific information pulled into the AVS if appropriate.     Yudi Tenorio,  APRN  04/10/24  09:44 EDT    Dictated Utilizing Dragon Dictation

## 2024-04-16 ENCOUNTER — HOSPITAL ENCOUNTER (OUTPATIENT)
Dept: CARDIOLOGY | Facility: HOSPITAL | Age: 64
Discharge: HOME | End: 2024-04-16
Payer: COMMERCIAL

## 2024-04-16 DIAGNOSIS — R06.09 DYSPNEA ON EXERTION: ICD-10-CM

## 2024-04-16 DIAGNOSIS — Z01.810 PRE-OPERATIVE CARDIOVASCULAR EXAMINATION: ICD-10-CM

## 2024-04-16 PROCEDURE — 93017 CV STRESS TEST TRACING ONLY: CPT

## 2024-04-17 ENCOUNTER — TELEPHONE (OUTPATIENT)
Dept: CARDIOLOGY | Facility: CLINIC | Age: 64
End: 2024-04-17
Payer: COMMERCIAL

## 2024-04-17 LAB
BH CV IMMEDIATE POST RECOVERY TECH DATA SYMPTOMS: NORMAL
BH CV IMMEDIATE POST TECH DATA BLOOD PRESSURE: NORMAL MMHG
BH CV IMMEDIATE POST TECH DATA HEART RATE: 138 BPM
BH CV IMMEDIATE POST TECH DATA OXYGEN SATS: 96 %
BH CV NINE MINUTE RECOVERY TECH DATA BLOOD PRESSURE: NORMAL MMHG
BH CV NINE MINUTE RECOVERY TECH DATA HEART RATE: 94 BPM
BH CV NINE MINUTE RECOVERY TECH DATA OXYGEN SATURATION: 96 %
BH CV NINE MINUTE RECOVERY TECH DATA SYMPTOMS: NORMAL
BH CV SIX MINUTE RECOVERY TECH DATA BLOOD PRESSURE: NORMAL
BH CV SIX MINUTE RECOVERY TECH DATA HEART RATE: 103 BPM
BH CV SIX MINUTE RECOVERY TECH DATA OXYGEN SATURATION: 97 %
BH CV SIX MINUTE RECOVERY TECH DATA SYMPTOMS: NORMAL
BH CV STRESS BP STAGE 1: NORMAL
BH CV STRESS BP STAGE 2: NORMAL
BH CV STRESS DURATION MIN STAGE 1: 3
BH CV STRESS DURATION MIN STAGE 2: 3
BH CV STRESS DURATION SEC STAGE 1: 0
BH CV STRESS DURATION SEC STAGE 2: 0
BH CV STRESS GRADE STAGE 1: 10
BH CV STRESS GRADE STAGE 2: 10
BH CV STRESS HR STAGE 1: 120
BH CV STRESS HR STAGE 2: 138
BH CV STRESS METS STAGE 1: 5
BH CV STRESS METS STAGE 2: 7.5
BH CV STRESS O2 STAGE 1: 99
BH CV STRESS O2 STAGE 2: 96
BH CV STRESS PROTOCOL 1: NORMAL
BH CV STRESS RECOVERY BP: NORMAL MMHG
BH CV STRESS RECOVERY HR: 94 BPM
BH CV STRESS RECOVERY O2: 96 %
BH CV STRESS SPEED STAGE 1: 1.7
BH CV STRESS SPEED STAGE 2: 2
BH CV STRESS STAGE 1: 1
BH CV STRESS STAGE 2: 2
BH CV THREE MINUTE POST TECH DATA BLOOD PRESSURE: NORMAL MMHG
BH CV THREE MINUTE POST TECH DATA HEART RATE: 98 BPM
BH CV THREE MINUTE POST TECH DATA OXYGEN SATURATION: 97 %
BH CV THREE MINUTE RECOVERY TECH DATA SYMPTOM: NORMAL
MAXIMAL PREDICTED HEART RATE: 158 BPM
PERCENT MAX PREDICTED HR: 87.34 %
STRESS BASELINE BP: NORMAL MMHG
STRESS BASELINE HR: 84 BPM
STRESS O2 SAT REST: 95 %
STRESS PERCENT HR: 103 %
STRESS POST ESTIMATED WORKLOAD: 5.2 METS
STRESS POST EXERCISE DUR MIN: 6 MIN
STRESS POST EXERCISE DUR SEC: 0 SEC
STRESS POST O2 SAT PEAK: 99 %
STRESS POST PEAK BP: NORMAL MMHG
STRESS POST PEAK HR: 138 BPM
STRESS TARGET HR: 134 BPM

## 2024-04-17 NOTE — TELEPHONE ENCOUNTER
REQUEST FOR CARDIAC CLEARANCE    Caller name: Jacky Rodriguez     Phone Number: [phone]    Surgeon's name: DR DEZ KELLY, SURGEON OF ONCOLOGY     Type of planned surgery: ONCOLOGY SMALL BOWEL RESECTION (ROBOTIC)    Date of planned surgery: 4.26.24    Type of anesthesia: GENERAL     Have you been experiencing chest pain or shortness of breath? NO    Is your doctor requesting for you to stop any of your medications prior to your surgery? BLOOD THINNERS 5 DAYS PRIOR TO SURGERY.     Where should we fax the clearance to? FAX: [phone]

## 2024-04-17 NOTE — TELEPHONE ENCOUNTER
Procedure: ONCOLOGY SMALL BOWEL RESECTION (ROBOTIC)     Medication Directive: NA    PMH: MCKENZIE, Syncope and Collapse, HTN    Last Seen: 04/10/24    STRESS: 04/16/24    Adequate aerobic functional capacity.  Normal blood pressure and heart rate response to exercise.  No significant arrhythmias were noted.  Baseline ECG shows normal sinus rhythm.  At peak stress, no ischemic ST-T changes were noted.  Impressions are consistent with low risk study.

## 2024-04-18 ENCOUNTER — TELEPHONE (OUTPATIENT)
Dept: CARDIOLOGY | Facility: CLINIC | Age: 64
End: 2024-04-18
Payer: COMMERCIAL

## 2024-04-18 NOTE — TELEPHONE ENCOUNTER
----- Message from JAZMÍN Simpson sent at 4/18/2024  1:00 PM EDT -----  Notify patient the results of his stress test were unremarkable. There were no signs concerning for blockages in his heart. These tests are not 100% accurate so if he has further concerns, let us know.

## 2024-04-18 NOTE — TELEPHONE ENCOUNTER
Hub staff attempted to follow warm transfer process and was unsuccessful     Caller: [facility]    Relationship to patient: Other    Best call back number: [phone]    Patient is needing: [facility] IS IN NEED TO SPEAK WITH SOMEONE REGARDING THE STATUS OF THE CARDIAC CLEARANCE REQUEST SO THAT THE MUTUAL PATIENT CAN BE SEEN FOR SURGERY ON 4.26.24. PLEASE CONTACT [place] @ [phone].

## 2024-05-28 ENCOUNTER — TELEPHONE (OUTPATIENT)
Dept: FAMILY MEDICINE CLINIC | Facility: CLINIC | Age: 64
End: 2024-05-28
Payer: COMMERCIAL

## 2024-06-06 NOTE — PROGRESS NOTES
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 Asa Ansari at [facility] on 4/29/2024  - Path (4/26/2024) indicated GIST          Jacky Rodriguez is a 63 y.o. male who presents to [facility] HEMATOLOGY & ONCOLOGY for discussion of systemic therapy for jejunal GIST.    Pt was initially evaluated by Thien Bower MD, GI physician at [facility]. He was referred for consideration for systemic therapy by Dr. Ansari after surgical resection.    Per review of surgical oncology notes from [facility] Surgical Oncology:    He underwent CT A/P w/ contrast revealed a 2.8 cm hypervascular mural mass involving the proximal jejunum. He had increased diarrhea and vomiting at presentation. He has a history of HTN. He denies tobacco use or heavy alcohol use, but he has been exposed to nicotine use in his daughter and late wife. He reports working as a .     FH: significant for his father having stomach cancer that metastasized to his brain.    - EUS on 3/27/2024 at [facility] found no lesions but could not advance past the ligament of treitz  - CT A/P w/ contrast revealed a 2.8 cm hypervascular mural mass involving the proximal jejunum  - s/p robotic assisted resection of intra-abdominal mass with jejunal resection (4/26/2024) performed by Dr. Asa Ansari at [facility] on 4/29/2024  - Path (4/26/2024) indicated GIST     EUS (3/27/2024)  ENDOSCOPIC ULTRASOUND FINDINGS:   -Linear/Radial EUS advanced  -The  pancreas is hyperechoic and fatty appearing, no PD dilation. The CBD is non dilated. The liver is hyperechoic suggesting fatty liver.   -We advanced the endoscope to the D3/D4 region and scrolled around looking for an abnormality. The echoendoscope is not long enough to advance past the ligament of treitz. No lesions were found.    3/13/2024 CT enterography  1. There is a 2.8 cm hypervascular mural mass involving the proximal jejunum, with no   intussusception or obstruction. The mass is probably mural or submucosal in origin, rather than   mucosal. A malignant mass is not excluded, although there is no evidence of metastatic disease.   Differential considerations include GIST and atypical carcinoid   2. Hepatic steatosis   3. Colonic diverticulosis     CT C/A/P (3/22/2024)  IMPRESSION:   1. No evidence of bowel obstruction or inflammation.   2. Diffuse hepatic steatosis.   3. Sigmoid diverticulosis without acute diverticulitis.     CT Enterography A/P (3/13/2024)  IMPRESSION:   1. There is a 2.8 cm hypervascular mural mass involving the proximal jejunum, with no intussusception or obstruction. The mass is probably mural or submucosal in origin, rather than mucosal. A malignant mass is not excluded, although there is no evidence of metastatic disease. Differential considerations include GIST and atypical carcinoid   2. Hepatic steatosis   3. Colonic diverticulosis     CT A/P (2/28/2024)  IMPRESSION:   1. No acute intra-abdominal or intrapelvic abnormalities.   2. Mild hepatomegaly with diffuse hepatic steatosis .   3. Extensive sigmoid diverticulosis without diverticulitis.   4. There is a 2.3 cm slightly hyperdense circumscribed area in the mid small bowel in the left abdomen, mass is not excluded, this could be a developing transient small bowel intussusception but it does not have the classic appearance as such and there are no prior studies for comparison.   Consider a follow-up nonemergent small-bowel  "follow-through or possibly CT enterography if there are no prior abdominal and pelvic CTs at other institutions for comparison.       Review of Systems     Oncology/Hematology History    No history exists.       Objective     Vitals:    06/07/24 1300   BP: 133/87   Pulse: 83   Resp: 18   Temp: 98.6 °F (37 °C)   TempSrc: Temporal   SpO2: 96%   Weight: 110 kg (242 lb)   Height: 172.2 cm (67.8\")   PainSc: 0-No pain     ECOG score: 0         PHQ-9 Total Score:         Physical Exam  Vitals reviewed. Exam conducted with a chaperone present.   Constitutional:       Appearance: Normal appearance.   HENT:      Head: Normocephalic and atraumatic.   Eyes:      Extraocular Movements: Extraocular movements intact.      Conjunctiva/sclera: Conjunctivae normal.   Pulmonary:      Effort: Pulmonary effort is normal.   Abdominal:      Comments: Well healing surgical incision from abdominal surgery, no purulence or drainage appreciated    Neurological:      Mental Status: He is alert.           Past Medical History     Past Medical History:   Diagnosis Date    Chest pain     GERD (gastroesophageal reflux disease)     High blood pressure     SOB (shortness of breath) on exertion     Syncope      Current Outpatient Medications on File Prior to Visit   Medication Sig Dispense Refill    lisinopril-hydrochlorothiazide (PRINZIDE,ZESTORETIC) 20-12.5 MG per tablet Take 1 tablet by mouth Daily. 90 tablet 3    metoprolol succinate XL (Toprol XL) 25 MG 24 hr tablet Take 1 tablet by mouth Daily. 90 tablet 3    pantoprazole (Protonix) 40 MG EC tablet Take 1 tablet by mouth Daily for 90 days. 90 tablet 1    sildenafil (Viagra) 100 MG tablet Take 1 tablet by mouth Daily As Needed for Erectile Dysfunction. 10 tablet 11    HYDROcodone-acetaminophen (NORCO) 5-325 MG per tablet  (Patient not taking: Reported on 6/7/2024)      ondansetron ODT (ZOFRAN-ODT) 4 MG disintegrating tablet Place 1 tablet on the tongue Every 8 (Eight) Hours As Needed for Nausea " or Vomiting. (Patient not taking: Reported on 6/7/2024) 15 tablet 0    oxyCODONE-acetaminophen (PERCOCET) 5-325 MG per tablet Take 1 tablet by mouth Every 6 (Six) Hours As Needed for Severe Pain. (Patient not taking: Reported on 6/7/2024) 12 tablet 0     No current facility-administered medications on file prior to visit.      Allergies   Allergen Reactions    Cefdinir Swelling     Past Surgical History:   Procedure Laterality Date    COLONOSCOPY      PARTIAL HIP ARTHROPLASTY Bilateral      Social History     Socioeconomic History    Marital status:    Tobacco Use    Smoking status: Never     Passive exposure: Never    Smokeless tobacco: Never   Vaping Use    Vaping status: Never Used   Substance and Sexual Activity    Alcohol use: Not Currently     Comment: occ    Drug use: Never    Sexual activity: Defer     Family History   Problem Relation Age of Onset    Heart failure Mother     No Known Problems Father     Colon cancer Neg Hx        Results     Result Review   The following data was reviewed by: Anders Zavala MD on 06/07/2024:  Lab Results   Component Value Date    HGB 14.0 06/07/2024    HCT 42.7 06/07/2024    MCV 85.2 06/07/2024     06/07/2024    WBC 8.80 06/07/2024    NEUTROABS 4.22 06/07/2024    LYMPHSABS 3.45 (H) 06/07/2024    MONOSABS 0.81 06/07/2024    EOSABS 0.22 06/07/2024    BASOSABS 0.07 06/07/2024     Lab Results   Component Value Date    GLUCOSE 145 (H) 06/07/2024    BUN 15 06/07/2024    CREATININE 0.99 06/07/2024     06/07/2024    K 3.9 06/07/2024    CL 98 06/07/2024    CO2 26.5 06/07/2024    CALCIUM 9.8 06/07/2024    PROTEINTOT 7.9 06/07/2024    ALBUMIN 4.6 06/07/2024    BILITOT 0.3 06/07/2024    ALKPHOS 89 06/07/2024    AST 40 06/07/2024    ALT 42 (H) 06/07/2024     Lab Results   Component Value Date    MG 2.1 03/22/2024    FREET4 1.51 07/19/2023    TSH 2.950 07/19/2023           No radiology results for the last day       Assessment & Plan     Diagnoses and all orders for  this visit:    1. Gastrointestinal stromal tumor (GIST) (Primary)  -     CBC & Differential; Future  -     Comprehensive Metabolic Panel; Future  -     [facility] xT DNA And RNA - Tissue,; Future  -     CT chest w contrast; Future  -     CT abdomen pelvis w contrast; Future  -     [facility] xT DNA And RNA - Tissue,        Jacky Rodriguez is a 63 y.o. 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     -- EUS on 3/27/2024 at [facility] found no lesions but could not advance past the ligament of treitz  - CT A/P w/ contrast revealed a 2.8 cm hypervascular mural mass involving the proximal jejunum  - s/p robotic assisted resection of intra-abdominal mass with jejunal resection (4/26/2024) performed by Dr. Asa Ansari at [facility] on 4/29/2024  - Path (4/26/2024) indicated GIST   -Pt here to discuss systemic therapy treatment recommendations.    -discussed plan to re-scan with CT CAP  -will obtain [facility] NGS testing to stratify his risk, suspect he is low risk, but given location of GIST I would like to confirm this   -reviewed NCCN guidelines in detail with pt today  -will have staff here contact pathology at [facility] to confirm mitotic rate which is important for risk stratification  -plan to obtain baseline CBC and CMP today    -plan for pt follow up in 4 weeks to discuss [facility] tissue NGS testing, restaging imaging, and final treatment plan.     Please note that portions of this note were completed with a voice recognition program.    Electronically signed by Anders Zavala MD, 06/07/24, 4:37 PM EDT.      Follow Up     I spent 60 minutes caring for Jacky on this date of service. This time includes time spent by me in the following activities:preparing for the visit, reviewing tests, obtaining and/or reviewing a separately obtained history, performing a medically appropriate examination and/or evaluation , counseling and educating the patient/family/caregiver,  ordering medications, tests, or procedures, referring and communicating with other health care professionals , documenting information in the medical record, independently interpreting results and communicating that information with the patient/family/caregiver, and care coordination.    This is an acute or chronic illness that poses a threat to life or bodily function. The above treatment plan involves a high risk of complications and/or mortality of patient management.    The patient was seen and examined. Work by the provider also included review and/or ordering of lab tests, review and/or ordering of radiology tests, review and/or ordering of medicine tests, discussion with other physicians or providers, independent review of data, obtaining old records, review/summation of old records, and/or other review.    I have reviewed the family history, social history, and past medical history for this patient. Previous information and data has been reviewed and updated as needed. I have reviewed and verified the chief complaint, history, and other documentation. The patient was interviewed and examined in the clinic and the chart reviewed. The previous observations, recommendations, and conclusions were reviewed including those of other providers.     The plan was discussed with the patient and/or family. The patient was given time to ask questions and these questions were answered. At the conclusion of their visit they had no additional questions or concerns and all questions were answered to their satisfaction.    Patient was given instructions and counseling regarding his condition or for health maintenance advice. Please see specific information pulled into the AVS if appropriate.

## 2024-06-07 ENCOUNTER — LAB (OUTPATIENT)
Dept: ONCOLOGY | Facility: HOSPITAL | Age: 64
End: 2024-06-07
Payer: COMMERCIAL

## 2024-06-07 ENCOUNTER — CONSULT (OUTPATIENT)
Dept: ONCOLOGY | Facility: HOSPITAL | Age: 64
End: 2024-06-07
Payer: COMMERCIAL

## 2024-06-07 VITALS
WEIGHT: 242 LBS | TEMPERATURE: 98.6 F | RESPIRATION RATE: 18 BRPM | SYSTOLIC BLOOD PRESSURE: 133 MMHG | HEART RATE: 83 BPM | HEIGHT: 68 IN | DIASTOLIC BLOOD PRESSURE: 87 MMHG | OXYGEN SATURATION: 96 % | BODY MASS INDEX: 36.68 KG/M2

## 2024-06-07 DIAGNOSIS — C49.A0 GASTROINTESTINAL STROMAL TUMOR (GIST): ICD-10-CM

## 2024-06-07 DIAGNOSIS — C49.A0 GASTROINTESTINAL STROMAL TUMOR (GIST): Primary | ICD-10-CM

## 2024-06-07 LAB
ALBUMIN SERPL-MCNC: 4.6 G/DL (ref 3.5–5.2)
ALBUMIN/GLOB SERPL: 1.4 G/DL
ALP SERPL-CCNC: 89 U/L (ref 39–117)
ALT SERPL W P-5'-P-CCNC: 42 U/L (ref 1–41)
ANION GAP SERPL CALCULATED.3IONS-SCNC: 11.5 MMOL/L (ref 5–15)
AST SERPL-CCNC: 40 U/L (ref 1–40)
BASOPHILS # BLD AUTO: 0.07 10*3/MM3 (ref 0–0.2)
BASOPHILS NFR BLD AUTO: 0.8 % (ref 0–1.5)
BILIRUB SERPL-MCNC: 0.3 MG/DL (ref 0–1.2)
BUN SERPL-MCNC: 15 MG/DL (ref 8–23)
BUN/CREAT SERPL: 15.2 (ref 7–25)
CALCIUM SPEC-SCNC: 9.8 MG/DL (ref 8.6–10.5)
CHLORIDE SERPL-SCNC: 98 MMOL/L (ref 98–107)
CO2 SERPL-SCNC: 26.5 MMOL/L (ref 22–29)
CREAT SERPL-MCNC: 0.99 MG/DL (ref 0.76–1.27)
DEPRECATED RDW RBC AUTO: 38.9 FL (ref 37–54)
EGFRCR SERPLBLD CKD-EPI 2021: 86.1 ML/MIN/1.73
EOSINOPHIL # BLD AUTO: 0.22 10*3/MM3 (ref 0–0.4)
EOSINOPHIL NFR BLD AUTO: 2.5 % (ref 0.3–6.2)
ERYTHROCYTE [DISTWIDTH] IN BLOOD BY AUTOMATED COUNT: 12.7 % (ref 12.3–15.4)
GLOBULIN UR ELPH-MCNC: 3.3 GM/DL
GLUCOSE SERPL-MCNC: 145 MG/DL (ref 65–99)
HCT VFR BLD AUTO: 42.7 % (ref 37.5–51)
HGB BLD-MCNC: 14 G/DL (ref 13–17.7)
IMM GRANULOCYTES # BLD AUTO: 0.03 10*3/MM3 (ref 0–0.05)
IMM GRANULOCYTES NFR BLD AUTO: 0.3 % (ref 0–0.5)
LYMPHOCYTES # BLD AUTO: 3.45 10*3/MM3 (ref 0.7–3.1)
LYMPHOCYTES NFR BLD AUTO: 39.2 % (ref 19.6–45.3)
MCH RBC QN AUTO: 27.9 PG (ref 26.6–33)
MCHC RBC AUTO-ENTMCNC: 32.8 G/DL (ref 31.5–35.7)
MCV RBC AUTO: 85.2 FL (ref 79–97)
MONOCYTES # BLD AUTO: 0.81 10*3/MM3 (ref 0.1–0.9)
MONOCYTES NFR BLD AUTO: 9.2 % (ref 5–12)
NEUTROPHILS NFR BLD AUTO: 4.22 10*3/MM3 (ref 1.7–7)
NEUTROPHILS NFR BLD AUTO: 48 % (ref 42.7–76)
NRBC BLD AUTO-RTO: 0 /100 WBC (ref 0–0.2)
PLATELET # BLD AUTO: 283 10*3/MM3 (ref 140–450)
PMV BLD AUTO: 10.2 FL (ref 6–12)
POTASSIUM SERPL-SCNC: 3.9 MMOL/L (ref 3.5–5.2)
PROT SERPL-MCNC: 7.9 G/DL (ref 6–8.5)
RBC # BLD AUTO: 5.01 10*6/MM3 (ref 4.14–5.8)
SODIUM SERPL-SCNC: 136 MMOL/L (ref 136–145)
WBC NRBC COR # BLD AUTO: 8.8 10*3/MM3 (ref 3.4–10.8)

## 2024-06-07 PROCEDURE — 85025 COMPLETE CBC W/AUTO DIFF WBC: CPT | Performed by: INTERNAL MEDICINE

## 2024-06-07 PROCEDURE — 36415 COLL VENOUS BLD VENIPUNCTURE: CPT

## 2024-06-07 PROCEDURE — 80053 COMPREHEN METABOLIC PANEL: CPT | Performed by: INTERNAL MEDICINE

## 2024-06-07 RX ORDER — HYDROCODONE BITARTRATE AND ACETAMINOPHEN 5; 325 MG/1; MG/1
TABLET ORAL
COMMUNITY
Start: 2024-04-29

## 2024-06-19 LAB
DNA RANGE(S) EXAMINED NAR: NORMAL
GENE DIS ANL INTERP-IMP: POSITIVE
GENE DIS ASSESSED: NORMAL
GENE MUT TESTED BLD/T: 3.2 M/MB
MSI CA SPEC-IMP: NORMAL
REASON FOR STUDY: NORMAL
TEMPUS GERMLINE NOTE: NORMAL
TEMPUS PERTINENTNEGATIVES: NORMAL
TEMPUS PORTAL: NORMAL
TEMPUS THERAPY1: NORMAL
TEMPUS THERAPY2: NORMAL
TEMPUS THERAPY3: NORMAL
TEMPUS THERAPY4: NORMAL
TEMPUS THERAPYCOUNT: 4
TEMPUS TRIALCOUNT: 3
TEMPUS TRIALMATCHES1: NORMAL
TEMPUS TRIALMATCHES2: NORMAL
TEMPUS TRIALMATCHES3: NORMAL
TEMPUS XR RESULT 1: NORMAL

## 2024-06-21 ENCOUNTER — HOSPITAL ENCOUNTER (OUTPATIENT)
Dept: CT IMAGING | Facility: HOSPITAL | Age: 64
Discharge: HOME | End: 2024-06-21
Admitting: INTERNAL MEDICINE
Payer: COMMERCIAL

## 2024-06-21 DIAGNOSIS — C49.A0 GASTROINTESTINAL STROMAL TUMOR (GIST): ICD-10-CM

## 2024-06-21 PROCEDURE — 71260 CT THORAX DX C+: CPT

## 2024-06-21 PROCEDURE — 25510000001 IOPAMIDOL PER 1 ML: Performed by: INTERNAL MEDICINE

## 2024-06-21 PROCEDURE — 74177 CT ABD & PELVIS W/CONTRAST: CPT

## 2024-06-21 RX ADMIN — IOPAMIDOL 100 ML: 755 INJECTION, SOLUTION INTRAVENOUS at 10:30

## 2024-07-25 ENCOUNTER — OFFICE VISIT (OUTPATIENT)
Dept: ONCOLOGY | Facility: HOSPITAL | Age: 64
End: 2024-07-25
Payer: COMMERCIAL

## 2024-07-25 VITALS
RESPIRATION RATE: 18 BRPM | SYSTOLIC BLOOD PRESSURE: 118 MMHG | BODY MASS INDEX: 37.41 KG/M2 | TEMPERATURE: 98.6 F | HEART RATE: 94 BPM | HEIGHT: 68 IN | WEIGHT: 246.8 LBS | DIASTOLIC BLOOD PRESSURE: 85 MMHG | OXYGEN SATURATION: 96 %

## 2024-07-25 DIAGNOSIS — C49.A0 GASTROINTESTINAL STROMAL TUMOR (GIST): Primary | ICD-10-CM

## 2024-07-25 PROCEDURE — G0463 HOSPITAL OUTPT CLINIC VISIT: HCPCS | Performed by: INTERNAL MEDICINE

## 2024-07-25 PROCEDURE — 99215 OFFICE O/P EST HI 40 MIN: CPT | Performed by: INTERNAL MEDICINE

## 2024-07-25 RX ORDER — PANTOPRAZOLE SODIUM 40 MG/1
TABLET, DELAYED RELEASE ORAL
COMMUNITY
Start: 2024-06-28

## 2024-07-25 NOTE — PROGRESS NOTES
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 Asa Ansari at [facility] on 4/29/2024  - Path (4/26/2024) indicated GIST          Jacky Rodriguez is a 63 y.o. male who presents to [facility] for discussion of systemic therapy for jejunal GIST.    Pt was initially evaluated by Thien Bower MD, GI physician at [facility]. He was referred for consideration for systemic therapy by Dr. Ansari after surgical resection.    Per review of surgical oncology notes from [facility]:    He underwent CT A/P w/ contrast revealed a 2.8 cm hypervascular mural mass involving the proximal jejunum. He had increased diarrhea and vomiting at presentation. He has a history of HTN. He denies tobacco use or heavy alcohol use, but he has been exposed to nicotine use in his daughter and late wife. He reports working as a .     FH: significant for his father having stomach cancer that metastasized to his brain.    - EUS on 3/27/2024 at [facility] found no lesions but could not advance past the ligament of treitz  - CT A/P w/ contrast revealed a 2.8 cm hypervascular mural mass involving the proximal jejunum  - s/p robotic assisted resection of intra-abdominal mass with jejunal resection (4/26/2024) performed by Dr. Asa Ansari at [facility] on 4/29/2024  - Path (4/26/2024) indicated GIST     EUS (3/27/2024)  ENDOSCOPIC ULTRASOUND FINDINGS:   -Linear/Radial EUS advanced  -The  pancreas is hyperechoic and fatty appearing, no PD dilation. The CBD is non dilated. The liver is hyperechoic suggesting fatty liver.   -We advanced the endoscope to the D3/D4 region and scrolled around looking for an abnormality. The echoendoscope is not long enough to advance past the ligament of treitz. No lesions were found.    3/13/2024 CT enterography  1. There is a 2.8 cm hypervascular mural mass involving the proximal jejunum, with no   intussusception or obstruction. The mass is probably mural or submucosal in origin, rather than   mucosal. A malignant mass is not excluded, although there is no evidence of metastatic disease.   Differential considerations include GIST and atypical carcinoid   2. Hepatic steatosis   3. Colonic diverticulosis     CT C/A/P (3/22/2024)  IMPRESSION:   1. No evidence of bowel obstruction or inflammation.   2. Diffuse hepatic steatosis.   3. Sigmoid diverticulosis without acute diverticulitis.     CT Enterography A/P (3/13/2024)  IMPRESSION:   1. There is a 2.8 cm hypervascular mural mass involving the proximal jejunum, with no intussusception or obstruction. The mass is probably mural or submucosal in origin, rather than mucosal. A malignant mass is not excluded, although there is no evidence of metastatic disease. Differential considerations include GIST and atypical carcinoid   2. Hepatic steatosis   3. Colonic diverticulosis     CT A/P (2/28/2024)  IMPRESSION:   1. No acute intra-abdominal or intrapelvic abnormalities.   2. Mild hepatomegaly with diffuse hepatic steatosis .   3. Extensive sigmoid diverticulosis without diverticulitis.   4. There is a 2.3 cm slightly hyperdense circumscribed area in the mid small bowel in the left abdomen, mass is not excluded, this could be a developing transient small bowel intussusception but it does not have the classic appearance as such and there are no prior studies for comparison.   Consider a follow-up nonemergent small-bowel  "follow-through or possibly CT enterography if there are no prior abdominal and pelvic CTs at other institutions for comparison.     Case discussed with pathologist at [facility], Dr. Helm who shared mitotic rate was less than 5/HPF so low risk.      Interval History: Pt here to discuss restaging imaging and discuss adjuvant treatment options. Pt without any report of fever, chills, night sweats, Lymphadenopathy or unintentional weight loss.     Review of Systems   Constitutional:  Positive for fatigue.   Gastrointestinal:  Positive for indigestion.        Oncology/Hematology History    No history exists.       Objective     Vitals:    07/25/24 1553   BP: 118/85   Pulse: 94   Resp: 18   Temp: 98.6 °F (37 °C)   TempSrc: Temporal   SpO2: 96%   Weight: 112 kg (246 lb 12.8 oz)   Height: 172.2 cm (67.8\")   PainSc: 0-No pain       ECOG score: 0         PHQ-9 Total Score:         Physical Exam  Vitals reviewed. Exam conducted with a chaperone present.   Constitutional:       Appearance: Normal appearance.   HENT:      Head: Normocephalic and atraumatic.   Eyes:      Extraocular Movements: Extraocular movements intact.      Conjunctiva/sclera: Conjunctivae normal.   Pulmonary:      Effort: Pulmonary effort is normal.   Abdominal:      Comments: Well healing surgical incision from abdominal surgery, no purulence or drainage appreciated    Neurological:      Mental Status: He is alert.           Past Medical History     Past Medical History:   Diagnosis Date    Chest pain     GERD (gastroesophageal reflux disease)     High blood pressure     SOB (shortness of breath) on exertion     Syncope      Current Outpatient Medications on File Prior to Visit   Medication Sig Dispense Refill    lisinopril-hydrochlorothiazide (PRINZIDE,ZESTORETIC) 20-12.5 MG per tablet Take 1 tablet by mouth Daily. 90 tablet 3    metoprolol succinate XL (Toprol XL) 25 MG 24 hr tablet Take 1 tablet by mouth Daily. 90 tablet 3    pantoprazole (PROTONIX) 40 " MG EC tablet       sildenafil (Viagra) 100 MG tablet Take 1 tablet by mouth Daily As Needed for Erectile Dysfunction. 10 tablet 11    HYDROcodone-acetaminophen (NORCO) 5-325 MG per tablet  (Patient not taking: Reported on 6/7/2024)      ondansetron ODT (ZOFRAN-ODT) 4 MG disintegrating tablet Place 1 tablet on the tongue Every 8 (Eight) Hours As Needed for Nausea or Vomiting. (Patient not taking: Reported on 6/7/2024) 15 tablet 0    oxyCODONE-acetaminophen (PERCOCET) 5-325 MG per tablet Take 1 tablet by mouth Every 6 (Six) Hours As Needed for Severe Pain. (Patient not taking: Reported on 6/7/2024) 12 tablet 0     No current facility-administered medications on file prior to visit.      Allergies   Allergen Reactions    Cefdinir Swelling     Past Surgical History:   Procedure Laterality Date    COLONOSCOPY      PARTIAL HIP ARTHROPLASTY Bilateral      Social History     Socioeconomic History    Marital status:    Tobacco Use    Smoking status: Never     Passive exposure: Never    Smokeless tobacco: Never   Vaping Use    Vaping status: Never Used   Substance and Sexual Activity    Alcohol use: Not Currently     Comment: occ    Drug use: Never    Sexual activity: Defer     Family History   Problem Relation Age of Onset    Heart failure Mother     No Known Problems Father     Colon cancer Neg Hx        Results     Result Review   The following data was reviewed by: Anders Zavala MD on 06/07/2024:  Lab Results   Component Value Date    HGB 14.0 06/07/2024    HCT 42.7 06/07/2024    MCV 85.2 06/07/2024     06/07/2024    WBC 8.80 06/07/2024    NEUTROABS 4.22 06/07/2024    LYMPHSABS 3.45 (H) 06/07/2024    MONOSABS 0.81 06/07/2024    EOSABS 0.22 06/07/2024    BASOSABS 0.07 06/07/2024     Lab Results   Component Value Date    GLUCOSE 145 (H) 06/07/2024    BUN 15 06/07/2024    CREATININE 0.99 06/07/2024     06/07/2024    K 3.9 06/07/2024    CL 98 06/07/2024    CO2 26.5 06/07/2024    CALCIUM 9.8 06/07/2024     PROTEINTOT 7.9 06/07/2024    ALBUMIN 4.6 06/07/2024    BILITOT 0.3 06/07/2024    ALKPHOS 89 06/07/2024    AST 40 06/07/2024    ALT 42 (H) 06/07/2024     Lab Results   Component Value Date    MG 2.1 03/22/2024    FREET4 1.51 07/19/2023    TSH 2.950 07/19/2023           No radiology results for the last day       Assessment & Plan     Diagnoses and all orders for this visit:    1. Gastrointestinal stromal tumor (GIST) (Primary)  -     CT chest w contrast; Future  -     CT abdomen pelvis w contrast; Future  -     CBC & Differential; Future  -     Comprehensive Metabolic Panel; Future    Other orders  -     SCANNED PATHOLOGY          Jacky Rodriguez is a 63 y.o. 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     -- EUS on 3/27/2024 at [facility] found no lesions but could not advance past the ligament of treitz  - CT A/P w/ contrast revealed a 2.8 cm hypervascular mural mass involving the proximal jejunum  - s/p robotic assisted resection of intra-abdominal mass with jejunal resection (4/26/2024) performed by Dr. Asa Ansari at [facility] on 4/29/2024  - Path (4/26/2024) indicated GIST   -Pt here to discuss systemic therapy treatment recommendations.    -discussed plan to re-scan with CT CAP which occurred in 6/2024 was negative for metastatic disease  -obtained [employer] NGS testing which revealed KIT mutation alone response to imatinib, regorafenib, sunitinib, TMB low at 3.2  -Case discussed with pathologist at [facility] Dr. Helm who shared mitotic rate was less than 5/HPF so low risk, pt had TEMPUS testing could administer imatinib if progresses,   -reviewed NCCN guidelines in detail with pt today, per NCCN guidelines for low risk GIST, tumor size 2.4 cm, can observe, with H&P and imaging every 3-6 months for up to 5 years    -Plan to have pt follow up with me with CT CAP with contrast in 3 months and with labs CBC, CMP    Please note that portions of this  note were completed with a voice recognition program.      Electronically signed by Anders Zavala MD, 07/25/24, 4:29 PM EDT.        Follow Up     I spent 45 minutes caring for Jacky on this date of service. This time includes time spent by me in the following activities:preparing for the visit, reviewing tests, obtaining and/or reviewing a separately obtained history, performing a medically appropriate examination and/or evaluation , counseling and educating the patient/family/caregiver, ordering medications, tests, or procedures, referring and communicating with other health care professionals , documenting information in the medical record, independently interpreting results and communicating that information with the patient/family/caregiver, and care coordination.    This is an acute or chronic illness that poses a threat to life or bodily function. The above treatment plan involves a high risk of complications and/or mortality of patient management.    The patient was seen and examined. Work by the provider also included review and/or ordering of lab tests, review and/or ordering of radiology tests, review and/or ordering of medicine tests, discussion with other physicians or providers, independent review of data, obtaining old records, review/summation of old records, and/or other review.    I have reviewed the family history, social history, and past medical history for this patient. Previous information and data has been reviewed and updated as needed. I have reviewed and verified the chief complaint, history, and other documentation. The patient was interviewed and examined in the clinic and the chart reviewed. The previous observations, recommendations, and conclusions were reviewed including those of other providers.     The plan was discussed with the patient and/or family. The patient was given time to ask questions and these questions were answered. At the conclusion of their visit they had no  additional questions or concerns and all questions were answered to their satisfaction.    Patient was given instructions and counseling regarding his condition or for health maintenance advice. Please see specific information pulled into the AVS if appropriate.

## 2024-07-29 DIAGNOSIS — M15.9 PRIMARY OSTEOARTHRITIS INVOLVING MULTIPLE JOINTS: Chronic | ICD-10-CM

## 2024-07-29 RX ORDER — LISINOPRIL AND HYDROCHLOROTHIAZIDE 20; 12.5 MG/1; MG/1
1 TABLET ORAL DAILY
Qty: 90 TABLET | Refills: 2 | Status: SHIPPED | OUTPATIENT
Start: 2024-07-29

## 2024-08-06 DIAGNOSIS — N52.2 DRUG-INDUCED ERECTILE DYSFUNCTION: ICD-10-CM

## 2024-08-07 RX ORDER — SILDENAFIL 100 MG/1
TABLET, FILM COATED ORAL
Qty: 10 TABLET | Refills: 10 | Status: SHIPPED | OUTPATIENT
Start: 2024-08-07

## 2024-09-09 NOTE — PROGRESS NOTES
"Chief Complaint   Patient presents with   • URI   • Cough     cough and sneezing since last week, starting to get better        Subjective          Jacky Rodriguez presents to [facility]    Pt is here to be seen for sick symptoms. He has a cough and sneezing since last week. Those symptoms are beginning to get better. He wants tested for COVID and flu to make sure it isn't either of those.     He is also complaining of \"squeaking hips\". He has ANDIE hip replacements and they are making a squeaking noise when he bends down and stands back up.     He takes Lisinopril-hctz already and his BP is 145/90. He does have some sort of viral infection going on so we will recheck this BP at his next visit to determine if we need to increase his medication.       Past History:  Medical History: has no past medical history on file.   Surgical History: has no past surgical history on file.   Family History: family history is not on file.   Social History: reports that he has never smoked. He has never used smokeless tobacco. He reports that he does not drink alcohol and does not use drugs.  Allergies: Patient has no known allergies.  (Not in a hospital admission)       Social History     Socioeconomic History   • Marital status:    Tobacco Use   • Smoking status: Never Smoker   • Smokeless tobacco: Never Used   Substance and Sexual Activity   • Alcohol use: Never   • Drug use: Never       Health Maintenance Due   Topic Date Due   • ANNUAL PHYSICAL  Never done       Objective     Vital Signs:   /90 (BP Location: Left arm, Patient Position: Sitting)   Pulse 76   Temp 97.8 °F (36.6 °C) (Temporal)   Resp 20   Ht 177.8 cm (70\")   Wt 111 kg (245 lb)   SpO2 95%   BMI 35.15 kg/m²       Physical Exam  Constitutional:       Appearance: Normal appearance.   HENT:      Nose: Congestion and rhinorrhea present.      Mouth/Throat:      Mouth: Mucous membranes are moist.   Cardiovascular:      " Specimens verified per policy. Rate and Rhythm: Normal rate and regular rhythm.      Pulses: Normal pulses.      Heart sounds: Normal heart sounds.   Pulmonary:      Effort: Pulmonary effort is normal.      Breath sounds: Normal breath sounds.   Skin:     General: Skin is warm and dry.   Neurological:      General: No focal deficit present.      Mental Status: He is alert and oriented to person, place, and time.   Psychiatric:         Mood and Affect: Mood normal.         Behavior: Behavior normal.          Review of Systems   HENT: Positive for congestion and sneezing.    Respiratory: Positive for cough.         Result Review :                 Assessment and Plan    Diagnoses and all orders for this visit:    1. Upper respiratory tract infection, unspecified type (Primary)  -     azithromycin (Zithromax) 1 g powder; Take 1 packet by mouth 1 (One) Time for 1 dose.  Dispense: 1 packet; Refill: 0  -     methylPREDNISolone (MEDROL) 4 MG dose pack; Take as directed on package instructions.  Dispense: 1 each; Refill: 0    2. Cough  -     POCT SARS-CoV-2 Antigen ERI + Flu  -     methylPREDNISolone (MEDROL) 4 MG dose pack; Take as directed on package instructions.  Dispense: 1 each; Refill: 0    3. History of total bilateral knee replacement     Reassured patient that having an upper respiratory infection due to virus can cause inflammation in the joints and could be the reason his hips are squeaking. I offered to refer him to ortho if he would like a second opinion on that or to follow up after he recovers if it does not go away         Follow Up   Return in about 4 weeks (around 3/14/2022), or if symptoms worsen or fail to improve.  Patient was given instructions and counseling regarding his condition or for health maintenance advice. Please see specific information pulled into the AVS if appropriate.

## 2024-09-25 DIAGNOSIS — K21.9 GASTROESOPHAGEAL REFLUX DISEASE WITHOUT ESOPHAGITIS: Primary | ICD-10-CM

## 2024-09-26 RX ORDER — PANTOPRAZOLE SODIUM 40 MG/1
TABLET, DELAYED RELEASE ORAL
Qty: 90 TABLET | Refills: 0 | Status: SHIPPED | OUTPATIENT
Start: 2024-09-26

## 2024-10-25 ENCOUNTER — HOSPITAL ENCOUNTER (OUTPATIENT)
Dept: CT IMAGING | Facility: HOSPITAL | Age: 64
Discharge: HOME | End: 2024-10-25
Admitting: INTERNAL MEDICINE
Payer: COMMERCIAL

## 2024-10-25 DIAGNOSIS — C49.A0 GASTROINTESTINAL STROMAL TUMOR (GIST): ICD-10-CM

## 2024-10-25 LAB
CREAT BLDA-MCNC: 1.2 MG/DL (ref 0.6–1.3)
EGFRCR SERPLBLD CKD-EPI 2021: 68.4 ML/MIN/1.73

## 2024-10-25 PROCEDURE — 74177 CT ABD & PELVIS W/CONTRAST: CPT

## 2024-10-25 PROCEDURE — 82565 ASSAY OF CREATININE: CPT

## 2024-10-25 PROCEDURE — 25510000001 IOPAMIDOL PER 1 ML: Performed by: INTERNAL MEDICINE

## 2024-10-25 PROCEDURE — 71260 CT THORAX DX C+: CPT

## 2024-10-25 RX ORDER — IOPAMIDOL 755 MG/ML
100 INJECTION, SOLUTION INTRAVASCULAR
Status: COMPLETED | OUTPATIENT
Start: 2024-10-25 | End: 2024-10-25

## 2024-10-25 RX ADMIN — IOPAMIDOL 100 ML: 755 INJECTION, SOLUTION INTRAVENOUS at 10:00

## 2024-10-28 ENCOUNTER — LAB (OUTPATIENT)
Dept: LAB | Facility: HOSPITAL | Age: 64
End: 2024-10-28
Payer: COMMERCIAL

## 2024-10-28 DIAGNOSIS — C49.A0 GASTROINTESTINAL STROMAL TUMOR (GIST): ICD-10-CM

## 2024-10-28 LAB
ALBUMIN SERPL-MCNC: 4.1 G/DL (ref 3.5–5.2)
ALBUMIN/GLOB SERPL: 1.1 G/DL
ALP SERPL-CCNC: 81 U/L (ref 39–117)
ALT SERPL W P-5'-P-CCNC: 43 U/L (ref 1–41)
ANION GAP SERPL CALCULATED.3IONS-SCNC: 10.7 MMOL/L (ref 5–15)
AST SERPL-CCNC: 50 U/L (ref 1–40)
BASOPHILS # BLD AUTO: 0.06 10*3/MM3 (ref 0–0.2)
BASOPHILS NFR BLD AUTO: 0.7 % (ref 0–1.5)
BILIRUB SERPL-MCNC: 0.4 MG/DL (ref 0–1.2)
BUN SERPL-MCNC: 12 MG/DL (ref 8–23)
BUN/CREAT SERPL: 12.1 (ref 7–25)
CALCIUM SPEC-SCNC: 9.6 MG/DL (ref 8.6–10.5)
CHLORIDE SERPL-SCNC: 100 MMOL/L (ref 98–107)
CO2 SERPL-SCNC: 26.3 MMOL/L (ref 22–29)
CREAT SERPL-MCNC: 0.99 MG/DL (ref 0.76–1.27)
DEPRECATED RDW RBC AUTO: 41.5 FL (ref 37–54)
EGFRCR SERPLBLD CKD-EPI 2021: 86.1 ML/MIN/1.73
EOSINOPHIL # BLD AUTO: 0.14 10*3/MM3 (ref 0–0.4)
EOSINOPHIL NFR BLD AUTO: 1.7 % (ref 0.3–6.2)
ERYTHROCYTE [DISTWIDTH] IN BLOOD BY AUTOMATED COUNT: 12.6 % (ref 12.3–15.4)
GLOBULIN UR ELPH-MCNC: 3.6 GM/DL
GLUCOSE SERPL-MCNC: 137 MG/DL (ref 65–99)
HCT VFR BLD AUTO: 45.5 % (ref 37.5–51)
HGB BLD-MCNC: 14.7 G/DL (ref 13–17.7)
IMM GRANULOCYTES # BLD AUTO: 0.03 10*3/MM3 (ref 0–0.05)
IMM GRANULOCYTES NFR BLD AUTO: 0.4 % (ref 0–0.5)
LYMPHOCYTES # BLD AUTO: 3.51 10*3/MM3 (ref 0.7–3.1)
LYMPHOCYTES NFR BLD AUTO: 41.4 % (ref 19.6–45.3)
MCH RBC QN AUTO: 29.3 PG (ref 26.6–33)
MCHC RBC AUTO-ENTMCNC: 32.3 G/DL (ref 31.5–35.7)
MCV RBC AUTO: 90.6 FL (ref 79–97)
MONOCYTES # BLD AUTO: 0.69 10*3/MM3 (ref 0.1–0.9)
MONOCYTES NFR BLD AUTO: 8.1 % (ref 5–12)
NEUTROPHILS NFR BLD AUTO: 4.04 10*3/MM3 (ref 1.7–7)
NEUTROPHILS NFR BLD AUTO: 47.7 % (ref 42.7–76)
NRBC BLD AUTO-RTO: 0 /100 WBC (ref 0–0.2)
PLATELET # BLD AUTO: 240 10*3/MM3 (ref 140–450)
PMV BLD AUTO: 11 FL (ref 6–12)
POTASSIUM SERPL-SCNC: 4.1 MMOL/L (ref 3.5–5.2)
PROT SERPL-MCNC: 7.7 G/DL (ref 6–8.5)
RBC # BLD AUTO: 5.02 10*6/MM3 (ref 4.14–5.8)
SODIUM SERPL-SCNC: 137 MMOL/L (ref 136–145)
WBC NRBC COR # BLD AUTO: 8.47 10*3/MM3 (ref 3.4–10.8)

## 2024-10-28 PROCEDURE — 85025 COMPLETE CBC W/AUTO DIFF WBC: CPT

## 2024-10-28 PROCEDURE — 80053 COMPREHEN METABOLIC PANEL: CPT

## 2024-10-28 PROCEDURE — 36415 COLL VENOUS BLD VENIPUNCTURE: CPT

## 2024-10-30 ENCOUNTER — OFFICE VISIT (OUTPATIENT)
Dept: ONCOLOGY | Facility: HOSPITAL | Age: 64
End: 2024-10-30
Payer: COMMERCIAL

## 2024-10-30 VITALS
RESPIRATION RATE: 18 BRPM | WEIGHT: 247.6 LBS | DIASTOLIC BLOOD PRESSURE: 90 MMHG | SYSTOLIC BLOOD PRESSURE: 139 MMHG | BODY MASS INDEX: 37.53 KG/M2 | TEMPERATURE: 98 F | HEART RATE: 83 BPM | HEIGHT: 68 IN | OXYGEN SATURATION: 96 %

## 2024-10-30 DIAGNOSIS — C49.A0 GASTROINTESTINAL STROMAL TUMOR (GIST): Primary | ICD-10-CM

## 2024-10-30 PROCEDURE — G0463 HOSPITAL OUTPT CLINIC VISIT: HCPCS | Performed by: INTERNAL MEDICINE

## 2024-10-30 NOTE — PROGRESS NOTES
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 Asa Ansari at [facility] on 4/29/2024  - Path (4/26/2024) indicated GIST          Jacky Rodriguez is a 63 y.o. male who presents to [facility] HEMATOLOGY [facility] ONCOLOGY for discussion of systemic therapy for jejunal GIST.    Pt was initially evaluated by Thien Bower MD, GI physician at [facility]. He was referred for consideration for systemic therapy by Dr. Ansari after surgical resection.    Per review of surgical oncology notes from [facility] Surgical Oncology:    He underwent CT A/P w/ contrast revealed a 2.8 cm hypervascular mural mass involving the proximal jejunum. He had increased diarrhea and vomiting at presentation. He has a history of HTN. He denies tobacco use or heavy alcohol use, but he has been exposed to nicotine use in his daughter and late wife. He reports working as a .     FH: significant for his father having stomach cancer that metastasized to his brain.    - EUS on 3/27/2024 at [facility] found no lesions but could not advance past the ligament of treitz  - CT A/P w/ contrast revealed a 2.8 cm hypervascular mural mass involving the proximal jejunum  - s/p robotic assisted resection of intra-abdominal mass with jejunal resection (4/26/2024) performed by Dr. Asa Ansari at [facility] on 4/29/2024  - Path (4/26/2024) indicated GIST     EUS (3/27/2024)  ENDOSCOPIC ULTRASOUND FINDINGS:   -Linear/Radial EUS advanced  -The pancreas is  hyperechoic and fatty appearing, no PD dilation. The CBD is non dilated. The liver is hyperechoic suggesting fatty liver.   -We advanced the endoscope to the D3/D4 region and scrolled around looking for an abnormality. The echoendoscope is not long enough to advance past the ligament of treitz. No lesions were found.    3/13/2024 CT enterography  1. There is a 2.8 cm hypervascular mural mass involving the proximal jejunum, with no   intussusception or obstruction. The mass is probably mural or submucosal in origin, rather than   mucosal. A malignant mass is not excluded, although there is no evidence of metastatic disease.   Differential considerations include GIST and atypical carcinoid   2. Hepatic steatosis   3. Colonic diverticulosis     CT C/A/P (3/22/2024)  IMPRESSION:   1. No evidence of bowel obstruction or inflammation.   2. Diffuse hepatic steatosis.   3. Sigmoid diverticulosis without acute diverticulitis.     CT Enterography A/P (3/13/2024)  IMPRESSION:   1. There is a 2.8 cm hypervascular mural mass involving the proximal jejunum, with no intussusception or obstruction. The mass is probably mural or submucosal in origin, rather than mucosal. A malignant mass is not excluded, although there is no evidence of metastatic disease. Differential considerations include GIST and atypical carcinoid   2. Hepatic steatosis   3. Colonic diverticulosis     CT A/P (2/28/2024)  IMPRESSION:   1. No acute intra-abdominal or intrapelvic abnormalities.   2. Mild hepatomegaly with diffuse hepatic steatosis .   3. Extensive sigmoid diverticulosis without diverticulitis.   4. There is a 2.3 cm slightly hyperdense circumscribed area in the mid small bowel in the left abdomen, mass is not excluded, this could be a developing transient small bowel intussusception but it does not have the classic appearance as such and there are no prior studies for comparison.   Consider a follow-up nonemergent small-bowel follow-through or  "possibly CT enterography if there are no prior abdominal and pelvic CTs at other institutions for comparison.     Case discussed with pathologist at [facility], Dr. Helm who shared mitotic rate was less than 5/HPF so low risk.      Interval History: Pt here to discuss restaging imaging. Pt again without any report of fever, chills, night sweats, lymphadenopathy or unintentional weight loss.     Review of Systems   Constitutional:  Positive for fatigue.   Gastrointestinal:  Positive for indigestion.        Oncology/Hematology History    No history exists.       Objective     Vitals:    10/30/24 1537   BP: 139/90   Pulse: 83   Resp: 18   Temp: 98 °F (36.7 °C)   TempSrc: Temporal   SpO2: 96%   Weight: 112 kg (247 lb 9.6 oz)   Height: 172.2 cm (67.8\")   PainSc: 0-No pain         ECOG score: 0         PHQ-9 Total Score:         Physical Exam  Vitals reviewed. Exam conducted with a chaperone present.   Constitutional:       Appearance: Normal appearance.   HENT:      Head: Normocephalic and atraumatic.   Eyes:      Extraocular Movements: Extraocular movements intact.      Conjunctiva/sclera: Conjunctivae normal.   Pulmonary:      Effort: Pulmonary effort is normal.   Abdominal:      Comments: Well healing surgical incision from abdominal surgery, no purulence or drainage appreciated    Neurological:      Mental Status: He is alert.           Past Medical History     Past Medical History:   Diagnosis Date    Chest pain     GERD (gastroesophageal reflux disease)     High blood pressure     SOB (shortness of breath) on exertion     Syncope      Current Outpatient Medications on File Prior to Visit   Medication Sig Dispense Refill    lisinopril-hydrochlorothiazide (PRINZIDE,ZESTORETIC) 20-12.5 MG per tablet TAKE 1 TABLET BY MOUTH ONCE DAILY FOR BLOOD PRESSURE 90 tablet 2    metoprolol succinate XL (Toprol XL) 25 MG 24 hr tablet Take 1 tablet by mouth Daily. 90 tablet 3    pantoprazole (PROTONIX) 40 MG EC tablet TAKE 1 TABLET " BY MOUTH ONCE DAILY FOR STOMACH 90 tablet 0    sildenafil (VIAGRA) 100 MG tablet TAKE 1 TABLET BY MOUTH ONCE DAILY AS NEEDED FOR ED 10 tablet 10    HYDROcodone-acetaminophen (NORCO) 5-325 MG per tablet  (Patient not taking: Reported on 10/30/2024)      ondansetron ODT (ZOFRAN-ODT) 4 MG disintegrating tablet Place 1 tablet on the tongue Every 8 (Eight) Hours As Needed for Nausea or Vomiting. (Patient not taking: Reported on 10/30/2024) 15 tablet 0    oxyCODONE-acetaminophen (PERCOCET) 5-325 MG per tablet Take 1 tablet by mouth Every 6 (Six) Hours As Needed for Severe Pain. (Patient not taking: Reported on 10/30/2024) 12 tablet 0     No current facility-administered medications on file prior to visit.      Allergies   Allergen Reactions    Cefdinir Swelling     Past Surgical History:   Procedure Laterality Date    COLONOSCOPY      PARTIAL HIP ARTHROPLASTY Bilateral      Social History     Socioeconomic History    Marital status:    Tobacco Use    Smoking status: Never     Passive exposure: Never    Smokeless tobacco: Never   Vaping Use    Vaping status: Never Used   Substance and Sexual Activity    Alcohol use: Not Currently     Comment: occ    Drug use: Never    Sexual activity: Defer     Family History   Problem Relation Age of Onset    Heart failure Mother     No Known Problems Father     Colon cancer Neg Hx        Results     Result Review   The following data was reviewed by: Anders Zavala MD on 06/07/2024:  Lab Results   Component Value Date    HGB 14.7 10/28/2024    HCT 45.5 10/28/2024    MCV 90.6 10/28/2024     10/28/2024    WBC 8.47 10/28/2024    NEUTROABS 4.04 10/28/2024    LYMPHSABS 3.51 (H) 10/28/2024    MONOSABS 0.69 10/28/2024    EOSABS 0.14 10/28/2024    BASOSABS 0.06 10/28/2024     Lab Results   Component Value Date    GLUCOSE 137 (H) 10/28/2024    BUN 12 10/28/2024    CREATININE 0.99 10/28/2024     10/28/2024    K 4.1 10/28/2024     10/28/2024    CO2 26.3 10/28/2024     CALCIUM 9.6 10/28/2024    PROTEINTOT 7.7 10/28/2024    ALBUMIN 4.1 10/28/2024    BILITOT 0.4 10/28/2024    ALKPHOS 81 10/28/2024    AST 50 (H) 10/28/2024    ALT 43 (H) 10/28/2024     Lab Results   Component Value Date    MG 2.1 03/22/2024    FREET4 1.51 07/19/2023    TSH 2.950 07/19/2023           No radiology results for the last day  CT Chest With Contrast Diagnostic    Result Date: 10/28/2024  Impression: Impression: 1.No evidence of metastatic disease in the chest, abdomen, or pelvis. 2.Stable incidental ancillary findings are described above. Electronically Signed: Randy Wells DO  10/28/2024 9:57 AM EDT  Workstation ID: [id number]    CT Abdomen Pelvis With Contrast    Result Date: 10/28/2024  Impression: Impression: 1.No evidence of metastatic disease in the chest, abdomen, or pelvis. 2.Stable incidental ancillary findings are described above. Electronically Signed: Randy Wells DO  10/28/2024 9:57 AM EDT  Workstation ID: [id number]     Assessment & Plan     Diagnoses and all orders for this visit:    1. Gastrointestinal stromal tumor (GIST) (Primary)  -     CT chest w contrast; Future  -     CT abdomen pelvis w contrast; Future  -     CBC & Differential; Future  -     Comprehensive Metabolic Panel; Future            Jacky Rodriguez is a 63 y.o. 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     -- EUS on 3/27/2024 at [facility] found no lesions but could not advance past the ligament of treitz  - CT A/P w/ contrast revealed a 2.8 cm hypervascular mural mass involving the proximal jejunum  - s/p robotic assisted resection of intra-abdominal mass with jejunal resection (4/26/2024) performed by Dr. Asa Ansari at [facility] on 4/29/2024  - Path (4/26/2024) indicated GIST   -Pt here to discuss systemic therapy treatment recommendations.    -discussed plan to re-scan with CT CAP which occurred in 6/2024 was negative for metastatic disease  -obtained  [employer] NGS testing which revealed KIT mutation alone response to imatinib, regorafenib, sunitinib, TMB low at 3.2  -Case discussed with pathologist at [facility] Dr. Helm who shared mitotic rate was less than 5/HPF so low risk, pt had TEMPUS testing could administer imatinib if progresses,   -reviewed NCCN guidelines in detail with pt today, per NCCN guidelines for low risk GIST, tumor size 2.4 cm, can observe, with H&P and imaging every 3-6 months for up to 5 years  -pt declining referral back to GI for EGD/c-scope  -10/30/24: discussed results of CT chest, abdomen/pelvis from 10/25/2024 which was negative for disease progression, discussed most recent CBC and CMP which were unremarkable  -pt without any B symptoms, blood loss or melena today.  -discussed plan to continue active surveillance    -Plan to have pt follow up with me with CT CAP with contrast in 6 months and with labs CBC, CMP    Please note that portions of this note were completed with a voice recognition program.    Electronically signed by Anders Zavala MD, 10/30/24, 4:03 PM EDT.      Follow Up     I spent 30 minutes caring for Jacky on this date of service. This time includes time spent by me in the following activities:preparing for the visit, reviewing tests, obtaining and/or reviewing a separately obtained history, performing a medically appropriate examination and/or evaluation , counseling and educating the patient/family/caregiver, ordering medications, tests, or procedures, referring and communicating with other health care professionals , documenting information in the medical record, independently interpreting results and communicating that information with the patient/family/caregiver, and care coordination.    This is an acute or chronic illness that poses a threat to life or bodily function. The above treatment plan involves a high risk of complications and/or mortality of patient management.    The patient was seen and examined. Work by  the provider also included review and/or ordering of lab tests, review and/or ordering of radiology tests, review and/or ordering of medicine tests, discussion with other physicians or providers, independent review of data, obtaining old records, review/summation of old records, and/or other review.    I have reviewed the family history, social history, and past medical history for this patient. Previous information and data has been reviewed and updated as needed. I have reviewed and verified the chief complaint, history, and other documentation. The patient was interviewed and examined in the clinic and the chart reviewed. The previous observations, recommendations, and conclusions were reviewed including those of other providers.     The plan was discussed with the patient and/or family. The patient was given time to ask questions and these questions were answered. At the conclusion of their visit they had no additional questions or concerns and all questions were answered to their satisfaction.    Patient was given instructions and counseling regarding his condition or for health maintenance advice. Please see specific information pulled into the AVS if appropriate.

## 2024-12-27 DIAGNOSIS — K21.9 GASTROESOPHAGEAL REFLUX DISEASE WITHOUT ESOPHAGITIS: ICD-10-CM

## 2024-12-27 RX ORDER — PANTOPRAZOLE SODIUM 40 MG/1
TABLET, DELAYED RELEASE ORAL
Qty: 90 TABLET | Refills: 0 | Status: SHIPPED | OUTPATIENT
Start: 2024-12-27

## 2024-12-27 NOTE — TELEPHONE ENCOUNTER
Medication Requested PANTOPRAZOLE    Last Refill 11/26/2024    Last OV 3/13/2024    Next OV NONE SCHEDULED    Medication pended for approval and correct pharmacy verified YES

## 2025-03-27 DIAGNOSIS — K21.9 GASTROESOPHAGEAL REFLUX DISEASE WITHOUT ESOPHAGITIS: ICD-10-CM

## 2025-03-27 RX ORDER — PANTOPRAZOLE SODIUM 40 MG/1
TABLET, DELAYED RELEASE ORAL
Qty: 90 TABLET | Refills: 0 | Status: SHIPPED | OUTPATIENT
Start: 2025-03-27

## 2025-05-01 ENCOUNTER — HOSPITAL ENCOUNTER (OUTPATIENT)
Dept: CT IMAGING | Facility: HOSPITAL | Age: 65
Discharge: HOME | End: 2025-05-01
Admitting: INTERNAL MEDICINE
Payer: COMMERCIAL

## 2025-05-01 DIAGNOSIS — C49.A0 GASTROINTESTINAL STROMAL TUMOR (GIST): ICD-10-CM

## 2025-05-01 LAB
CREAT BLDA-MCNC: 0.9 MG/DL (ref 0.6–1.3)
EGFRCR SERPLBLD CKD-EPI 2021: 96 ML/MIN/1.73

## 2025-05-01 PROCEDURE — 82565 ASSAY OF CREATININE: CPT

## 2025-05-01 PROCEDURE — 71260 CT THORAX DX C+: CPT

## 2025-05-01 PROCEDURE — 25510000001 IOPAMIDOL PER 1 ML: Performed by: INTERNAL MEDICINE

## 2025-05-01 PROCEDURE — 74177 CT ABD & PELVIS W/CONTRAST: CPT

## 2025-05-01 RX ORDER — IOPAMIDOL 755 MG/ML
100 INJECTION, SOLUTION INTRAVASCULAR
Status: COMPLETED | OUTPATIENT
Start: 2025-05-01 | End: 2025-05-01

## 2025-05-01 RX ADMIN — IOPAMIDOL 100 ML: 755 INJECTION, SOLUTION INTRAVENOUS at 10:20

## 2025-05-05 ENCOUNTER — OFFICE VISIT (OUTPATIENT)
Dept: FAMILY MEDICINE CLINIC | Facility: CLINIC | Age: 65
End: 2025-05-05
Payer: COMMERCIAL

## 2025-05-05 ENCOUNTER — LAB (OUTPATIENT)
Dept: LAB | Facility: HOSPITAL | Age: 65
End: 2025-05-05
Payer: COMMERCIAL

## 2025-05-05 VITALS
RESPIRATION RATE: 16 BRPM | WEIGHT: 233 LBS | BODY MASS INDEX: 35.31 KG/M2 | DIASTOLIC BLOOD PRESSURE: 78 MMHG | OXYGEN SATURATION: 98 % | SYSTOLIC BLOOD PRESSURE: 125 MMHG | HEIGHT: 68 IN | HEART RATE: 68 BPM | TEMPERATURE: 97.7 F

## 2025-05-05 DIAGNOSIS — E66.01 CLASS 2 SEVERE OBESITY DUE TO EXCESS CALORIES WITH SERIOUS COMORBIDITY AND BODY MASS INDEX (BMI) OF 35.0 TO 35.9 IN ADULT: ICD-10-CM

## 2025-05-05 DIAGNOSIS — E66.812 CLASS 2 SEVERE OBESITY DUE TO EXCESS CALORIES WITH SERIOUS COMORBIDITY AND BODY MASS INDEX (BMI) OF 35.0 TO 35.9 IN ADULT: ICD-10-CM

## 2025-05-05 DIAGNOSIS — K21.9 GASTROESOPHAGEAL REFLUX DISEASE WITHOUT ESOPHAGITIS: ICD-10-CM

## 2025-05-05 DIAGNOSIS — C49.A0 GASTROINTESTINAL STROMAL TUMOR (GIST): ICD-10-CM

## 2025-05-05 DIAGNOSIS — R73.01 IMPAIRED FASTING GLUCOSE: ICD-10-CM

## 2025-05-05 DIAGNOSIS — Z12.5 SCREENING PSA (PROSTATE SPECIFIC ANTIGEN): ICD-10-CM

## 2025-05-05 DIAGNOSIS — Z00.00 PHYSICAL EXAM, ANNUAL: Primary | ICD-10-CM

## 2025-05-05 DIAGNOSIS — M15.0 PRIMARY OSTEOARTHRITIS INVOLVING MULTIPLE JOINTS: Chronic | ICD-10-CM

## 2025-05-05 DIAGNOSIS — N52.2 DRUG-INDUCED ERECTILE DYSFUNCTION: ICD-10-CM

## 2025-05-05 DIAGNOSIS — I10 PRIMARY HYPERTENSION: ICD-10-CM

## 2025-05-05 PROBLEM — D49.0: Status: ACTIVE | Noted: 2024-04-15

## 2025-05-05 LAB
ALBUMIN SERPL-MCNC: 4.6 G/DL (ref 3.5–5.2)
ALBUMIN/GLOB SERPL: 1.2 G/DL
ALP SERPL-CCNC: 117 U/L (ref 39–117)
ALT SERPL W P-5'-P-CCNC: 68 U/L (ref 1–41)
ANION GAP SERPL CALCULATED.3IONS-SCNC: 13 MMOL/L (ref 5–15)
AST SERPL-CCNC: 59 U/L (ref 1–40)
BASOPHILS # BLD AUTO: 0.09 10*3/MM3 (ref 0–0.2)
BASOPHILS NFR BLD AUTO: 1 % (ref 0–1.5)
BILIRUB SERPL-MCNC: 0.8 MG/DL (ref 0–1.2)
BUN SERPL-MCNC: 14 MG/DL (ref 8–23)
BUN/CREAT SERPL: 14.3 (ref 7–25)
CALCIUM SPEC-SCNC: 10 MG/DL (ref 8.6–10.5)
CHLORIDE SERPL-SCNC: 95 MMOL/L (ref 98–107)
CHOLEST SERPL-MCNC: 134 MG/DL (ref 0–200)
CO2 SERPL-SCNC: 27 MMOL/L (ref 22–29)
CREAT SERPL-MCNC: 0.98 MG/DL (ref 0.76–1.27)
DEPRECATED RDW RBC AUTO: 40.5 FL (ref 37–54)
EGFRCR SERPLBLD CKD-EPI 2021: 86.6 ML/MIN/1.73
EOSINOPHIL # BLD AUTO: 0.14 10*3/MM3 (ref 0–0.4)
EOSINOPHIL NFR BLD AUTO: 1.6 % (ref 0.3–6.2)
ERYTHROCYTE [DISTWIDTH] IN BLOOD BY AUTOMATED COUNT: 12 % (ref 12.3–15.4)
GLOBULIN UR ELPH-MCNC: 3.8 GM/DL
GLUCOSE SERPL-MCNC: 356 MG/DL (ref 65–99)
HBA1C MFR BLD: 12.2 % (ref 4.8–5.6)
HCT VFR BLD AUTO: 47.9 % (ref 37.5–51)
HDLC SERPL-MCNC: 37 MG/DL (ref 40–60)
HGB BLD-MCNC: 16 G/DL (ref 13–17.7)
IMM GRANULOCYTES # BLD AUTO: 0.02 10*3/MM3 (ref 0–0.05)
IMM GRANULOCYTES NFR BLD AUTO: 0.2 % (ref 0–0.5)
LDLC SERPL CALC-MCNC: 77 MG/DL (ref 0–100)
LDLC/HDLC SERPL: 2.03 {RATIO}
LYMPHOCYTES # BLD AUTO: 4.04 10*3/MM3 (ref 0.7–3.1)
LYMPHOCYTES NFR BLD AUTO: 46.2 % (ref 19.6–45.3)
MCH RBC QN AUTO: 30.7 PG (ref 26.6–33)
MCHC RBC AUTO-ENTMCNC: 33.4 G/DL (ref 31.5–35.7)
MCV RBC AUTO: 91.8 FL (ref 79–97)
MONOCYTES # BLD AUTO: 0.57 10*3/MM3 (ref 0.1–0.9)
MONOCYTES NFR BLD AUTO: 6.5 % (ref 5–12)
NEUTROPHILS NFR BLD AUTO: 3.88 10*3/MM3 (ref 1.7–7)
NEUTROPHILS NFR BLD AUTO: 44.5 % (ref 42.7–76)
NRBC BLD AUTO-RTO: 0 /100 WBC (ref 0–0.2)
PLATELET # BLD AUTO: 230 10*3/MM3 (ref 140–450)
PMV BLD AUTO: 11.7 FL (ref 6–12)
POTASSIUM SERPL-SCNC: 4.2 MMOL/L (ref 3.5–5.2)
PROT SERPL-MCNC: 8.4 G/DL (ref 6–8.5)
PSA SERPL-MCNC: 0.89 NG/ML (ref 0–4)
RBC # BLD AUTO: 5.22 10*6/MM3 (ref 4.14–5.8)
SODIUM SERPL-SCNC: 135 MMOL/L (ref 136–145)
T4 FREE SERPL-MCNC: 1.53 NG/DL (ref 0.92–1.68)
TRIGL SERPL-MCNC: 110 MG/DL (ref 0–150)
TSH SERPL DL<=0.05 MIU/L-ACNC: 3.58 UIU/ML (ref 0.27–4.2)
VLDLC SERPL-MCNC: 20 MG/DL (ref 5–40)
WBC NRBC COR # BLD AUTO: 8.74 10*3/MM3 (ref 3.4–10.8)

## 2025-05-05 PROCEDURE — 83036 HEMOGLOBIN GLYCOSYLATED A1C: CPT

## 2025-05-05 PROCEDURE — G0103 PSA SCREENING: HCPCS

## 2025-05-05 PROCEDURE — 99396 PREV VISIT EST AGE 40-64: CPT | Performed by: FAMILY MEDICINE

## 2025-05-05 PROCEDURE — 36415 COLL VENOUS BLD VENIPUNCTURE: CPT

## 2025-05-05 PROCEDURE — 80050 GENERAL HEALTH PANEL: CPT

## 2025-05-05 PROCEDURE — 80061 LIPID PANEL: CPT

## 2025-05-05 PROCEDURE — 84439 ASSAY OF FREE THYROXINE: CPT

## 2025-05-05 RX ORDER — LISINOPRIL AND HYDROCHLOROTHIAZIDE 12.5; 2 MG/1; MG/1
1 TABLET ORAL DAILY
Qty: 90 TABLET | Refills: 3 | Status: SHIPPED | OUTPATIENT
Start: 2025-05-05

## 2025-05-05 RX ORDER — PANTOPRAZOLE SODIUM 40 MG/1
40 TABLET, DELAYED RELEASE ORAL DAILY
Qty: 90 TABLET | Refills: 3 | Status: SHIPPED | OUTPATIENT
Start: 2025-05-05

## 2025-05-05 NOTE — PROGRESS NOTES
"Chief Complaint  Annual Exam    Subjective          Jacky Rodriguez presents to [facility]  History of Present Illness    History of Present Illness  The patient is a 64-year-old male who presents for a follow-up annual exam. He recently had a tumor removed last year.    He reports an increase in water intake, which he believes may be contributing to his weight loss. He experiences frequent urination, necessitating bathroom visits approximately every hour until 3:00 AM. This issue is so severe that it interferes with his ability to drive for extended periods without needing to stop for a restroom break. He has been taking medication for heartburn, prescribed by an emergency room physician, but has not experienced any heartburn symptoms for the past 2 to 3 weeks since increasing his water intake. He mentions that his blood pressure is good and he is pleased with the results.    PAST SURGICAL HISTORY:  Tumor removal last year.      Objective   Vital Signs:   /78 (BP Location: Left arm, Patient Position: Sitting, Cuff Size: Adult)   Pulse 68   Temp 97.7 °F (36.5 °C)   Resp 16   Ht 172.2 cm (67.8\")   Wt 106 kg (233 lb)   SpO2 98%   BMI 35.64 kg/m²       Physical Exam  Vitals reviewed.   Constitutional:       Appearance: Normal appearance. He is well-developed.   HENT:      Head: Normocephalic and atraumatic.      Right Ear: External ear normal.      Left Ear: External ear normal.      Nose: Nose normal.   Eyes:      Conjunctiva/sclera: Conjunctivae normal.      Pupils: Pupils are equal, round, and reactive to light.   Cardiovascular:      Rate and Rhythm: Normal rate.   Pulmonary:      Effort: Pulmonary effort is normal.      Breath sounds: Normal breath sounds.   Abdominal:      General: There is no distension.   Skin:     General: Skin is warm and dry.   Neurological:      Mental Status: He is alert and oriented to person, place, and time. Mental status is at baseline. "   Psychiatric:         Mood and Affect: Mood and affect normal.         Behavior: Behavior normal.         Thought Content: Thought content normal.         Judgment: Judgment normal.          Result Review :   The following data was reviewed by: Daniel Faust DO on 05/05/2025:  Common labs          10/25/2024    09:54 10/28/2024    10:10 5/1/2025    10:10   Common Labs   Glucose  137     BUN  12     Creatinine 1.20  0.99  0.90    Sodium  137     Potassium  4.1     Chloride  100     Calcium  9.6     Albumin  4.1     Total Bilirubin  0.4     Alkaline Phosphatase  81     AST (SGOT)  50     ALT (SGPT)  43     WBC  8.47     Hemoglobin  14.7     Hematocrit  45.5     Platelets  240            Results                   Assessment and Plan    Diagnoses and all orders for this visit:    1. Physical exam, annual (Primary)    2. Drug-induced erectile dysfunction    3. Class 2 severe obesity due to excess calories with serious comorbidity and body mass index (BMI) of 35.0 to 35.9 in adult  -     Lipid panel; Future  -     TSH+Free T4; Future  -     Hemoglobin A1c; Future    4. Primary hypertension  -     Lipid panel; Future  -     TSH+Free T4; Future  -     Hemoglobin A1c; Future    5. Impaired fasting glucose  -     Lipid panel; Future  -     TSH+Free T4; Future  -     Hemoglobin A1c; Future    6. Primary osteoarthritis involving multiple joints  -     lisinopril-hydrochlorothiazide (PRINZIDE,ZESTORETIC) 20-12.5 MG per tablet; Take 1 tablet by mouth Daily. for blood pressure  Dispense: 90 tablet; Refill: 3    7. Gastroesophageal reflux disease without esophagitis  -     pantoprazole (PROTONIX) 40 MG EC tablet; Take 1 tablet by mouth Daily.  Dispense: 90 tablet; Refill: 3    8. Screening PSA (prostate specific antigen)  -     PSA Screen; Future        Assessment & Plan  1. Annual follow-up examination.  - Blood pressure readings are within the normal range.  - Weight loss noted, which is a positive development.  - Comprehensive  set of laboratory tests will be conducted to assess overall health status.  - Blood pressure medication will be refilled; advised to continue heartburn medication previously prescribed by an emergency room physician.    Reviewed chronic conditions, age risk factors and will order labs today to manage, screen and follow up at least every 12 months    Recommend appropriate cancer screens that are age appropriate unless already previously performed or not currently due to be repeated    Recommend wearing sunscreen and following up on any concerning skin conditions or lesions, wearing seat belts and other risk reduction measures to lessen incident of death, disease or other     Continue to monitor and manage weight, goal BMI approaching 30, calorie restriction <5213-9000 and activity up to 150 min a week suggested to help get   with weight loss    Counseled on risks, disease and advice given on screening and continued management of health and condition through the next year until next physical         Follow Up   Return in about 6 months (around 11/5/2025), or if symptoms worsen or fail to improve, for Next scheduled follow up, Recheck.    Patient was given instructions and counseling regarding his condition or for health maintenance advice. Please see specific information pulled into the AVS if appropriate.       Transcribed from ambient dictation for Daniel Faust DO by Daniel Faust DO.  05/05/25   11:32 EDT    Patient or patient representative verbalized consent for the use of Ambient Listening during the visit with  Daniel Faust DO for chart documentation. 5/5/2025  11:33 EDT

## 2025-05-07 ENCOUNTER — OFFICE VISIT (OUTPATIENT)
Dept: ONCOLOGY | Facility: HOSPITAL | Age: 65
End: 2025-05-07
Payer: COMMERCIAL

## 2025-05-07 VITALS
DIASTOLIC BLOOD PRESSURE: 91 MMHG | BODY MASS INDEX: 35.01 KG/M2 | OXYGEN SATURATION: 96 % | TEMPERATURE: 97.2 F | WEIGHT: 231 LBS | RESPIRATION RATE: 18 BRPM | HEART RATE: 99 BPM | SYSTOLIC BLOOD PRESSURE: 118 MMHG | HEIGHT: 68 IN

## 2025-05-07 DIAGNOSIS — C49.A3 GIST (GASTROINTESTINAL STROMAL TUMOR) OF SMALL BOWEL, MALIGNANT: Primary | ICD-10-CM

## 2025-05-07 PROCEDURE — G0463 HOSPITAL OUTPT CLINIC VISIT: HCPCS | Performed by: INTERNAL MEDICINE

## 2025-05-07 NOTE — PROGRESS NOTES
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 Asa Ansari at [facility] on 4/29/2024  - Path (4/26/2024) indicated GIST          Jacky Rodriguez is a 64 y.o. male who presents to [facility]EMAT[facility] for discussion of systemic therapy for jejunal GIST.    Pt was initially evaluated by Thien Bower MD, GI physician at [facility]. He was referred for consideration for systemic therapy by Dr. Ansari after surgical resection.    Per review of surgical oncology notes from [facility] Surgical [facility]:    He underwent CT A/P w/ contrast revealed a 2.8 cm hypervascular mural mass involving the proximal jejunum. He had increased diarrhea and vomiting at presentation. He has a history of HTN. He denies tobacco use or heavy alcohol use, but he has been exposed to nicotine use in his daughter and late wife. He reports working as a .     FH: significant for his father having stomach cancer that metastasized to his brain.    - EUS on 3/27/2024 at [facility] found no lesions but could not advance past the ligament of treitz  - CT A/P w/ contrast revealed a 2.8 cm hypervascular mural mass involving the proximal jejunum  - s/p robotic assisted resection of intra-abdominal mass with jejunal resection (4/26/2024) performed by Dr. Asa Ansari at [facility] on 4/29/2024  - Path (4/26/2024) indicated GIST     EUS (3/27/2024)  ENDOSCOPIC ULTRASOUND FINDINGS:   -Linear/Radial EUS advanced  -The  pancreas is hyperechoic and fatty appearing, no PD dilation. The CBD is non dilated. The liver is hyperechoic suggesting fatty liver.   -We advanced the endoscope to the D3/D4 region and scrolled around looking for an abnormality. The echoendoscope is not long enough to advance past the ligament of treitz. No lesions were found.    3/13/2024 CT enterography  1. There is a 2.8 cm hypervascular mural mass involving the proximal jejunum, with no   intussusception or obstruction. The mass is probably mural or submucosal in origin, rather than   mucosal. A malignant mass is not excluded, although there is no evidence of metastatic disease.   Differential considerations include GIST and atypical carcinoid   2. Hepatic steatosis   3. Colonic diverticulosis     CT C/A/P (3/22/2024)  IMPRESSION:   1. No evidence of bowel obstruction or inflammation.   2. Diffuse hepatic steatosis.   3. Sigmoid diverticulosis without acute diverticulitis.     CT Enterography A/P (3/13/2024)  IMPRESSION:   1. There is a 2.8 cm hypervascular mural mass involving the proximal jejunum, with no intussusception or obstruction. The mass is probably mural or submucosal in origin, rather than mucosal. A malignant mass is not excluded, although there is no evidence of metastatic disease. Differential considerations include GIST and atypical carcinoid   2. Hepatic steatosis   3. Colonic diverticulosis     CT A/P (2/28/2024)  IMPRESSION:   1. No acute intra-abdominal or intrapelvic abnormalities.   2. Mild hepatomegaly with diffuse hepatic steatosis .   3. Extensive sigmoid diverticulosis without diverticulitis.   4. There is a 2.3 cm slightly hyperdense circumscribed area in the mid small bowel in the left abdomen, mass is not excluded, this could be a developing transient small bowel intussusception but it does not have the classic appearance as such and there are no prior studies for comparison.   Consider a follow-up nonemergent small-bowel  "follow-through or possibly CT enterography if there are no prior abdominal and pelvic CTs at other institutions for comparison.     Case discussed with pathologist at [facility], Dr. Helm who shared mitotic rate was less than 5/HPF so low risk.      Interval History: Pt here again to discuss restaging imaging. Pt without any report of fever, chills, night sweats, lymphadenopathy or unintentional weight loss. He is here with his wife.     Review of Systems   Constitutional:  Positive for fatigue.   Gastrointestinal:  Positive for indigestion.        Oncology/Hematology History    No history exists.       Objective     Vitals:    05/07/25 1539   BP: 118/91   Pulse: 99   Resp: 18   Temp: 97.2 °F (36.2 °C)   TempSrc: Temporal   SpO2: 96%   Weight: 105 kg (231 lb)   Height: 172.2 cm (67.8\")   PainSc: 0-No pain           ECOG score: 0         PHQ-9 Total Score:         Physical Exam  Vitals reviewed. Exam conducted with a chaperone present.   Constitutional:       Appearance: Normal appearance.   HENT:      Head: Normocephalic and atraumatic.   Eyes:      Extraocular Movements: Extraocular movements intact.      Conjunctiva/sclera: Conjunctivae normal.   Pulmonary:      Effort: Pulmonary effort is normal.   Abdominal:      Comments: Well healing surgical incision from abdominal surgery, no purulence or drainage appreciated    Neurological:      Mental Status: He is alert.           Past Medical History     Past Medical History:   Diagnosis Date    Chest pain     GERD (gastroesophageal reflux disease)     High blood pressure     SOB (shortness of breath) on exertion     Syncope      Current Outpatient Medications on File Prior to Visit   Medication Sig Dispense Refill    lisinopril-hydrochlorothiazide (PRINZIDE,ZESTORETIC) 20-12.5 MG per tablet Take 1 tablet by mouth Daily. for blood pressure 90 tablet 3    metFORMIN (GLUCOPHAGE) 500 MG tablet Take 1 tablet by mouth 2 (Two) Times a Day With Meals. 60 tablet 5    " pantoprazole (PROTONIX) 40 MG EC tablet Take 1 tablet by mouth Daily. 90 tablet 3     No current facility-administered medications on file prior to visit.      Allergies   Allergen Reactions    Cefdinir Swelling     Past Surgical History:   Procedure Laterality Date    COLONOSCOPY      PARTIAL HIP ARTHROPLASTY Bilateral      Social History     Socioeconomic History    Marital status:    Tobacco Use    Smoking status: Never     Passive exposure: Never    Smokeless tobacco: Never   Vaping Use    Vaping status: Never Used   Substance and Sexual Activity    Alcohol use: Not Currently     Comment: occ    Drug use: Never    Sexual activity: Defer     Family History   Problem Relation Age of Onset    Heart failure Mother     No Known Problems Father     Colon cancer Neg Hx        Results     Result Review   The following data was reviewed by: Anders Zavala MD on 06/07/2024:  Lab Results   Component Value Date    HGB 16.0 05/05/2025    HCT 47.9 05/05/2025    MCV 91.8 05/05/2025     05/05/2025    WBC 8.74 05/05/2025    NEUTROABS 3.88 05/05/2025    LYMPHSABS 4.04 (H) 05/05/2025    MONOSABS 0.57 05/05/2025    EOSABS 0.14 05/05/2025    BASOSABS 0.09 05/05/2025     Lab Results   Component Value Date    GLUCOSE 356 (H) 05/05/2025    BUN 14 05/05/2025    CREATININE 0.98 05/05/2025     (L) 05/05/2025    K 4.2 05/05/2025    CL 95 (L) 05/05/2025    CO2 27.0 05/05/2025    CALCIUM 10.0 05/05/2025    PROTEINTOT 8.4 05/05/2025    ALBUMIN 4.6 05/05/2025    BILITOT 0.8 05/05/2025    ALKPHOS 117 05/05/2025    AST 59 (H) 05/05/2025    ALT 68 (H) 05/05/2025     Lab Results   Component Value Date    MG 2.1 03/22/2024    FREET4 1.53 05/05/2025    TSH 3.580 05/05/2025           No radiology results for the last day  CT Chest With Contrast Diagnostic  Result Date: 5/4/2025  Impression: Impression: 1.No evidence of metastasis within chest/abdomen/pelvis. 2.No acute process identified. 3.Hepatic steatosis. 4.Cholelithiasis.  5.Sigmoid diverticulosis. Electronically Signed: Mauro Block MD  5/4/2025 2:40 PM EDT  Workstation ID: [id number]    CT Abdomen Pelvis With Contrast  Result Date: 5/4/2025  Impression: Impression: 1.No evidence of metastasis within chest/abdomen/pelvis. 2.No acute process identified. 3.Hepatic steatosis. 4.Cholelithiasis. 5.Sigmoid diverticulosis. Electronically Signed: Mauro Block MD  5/4/2025 2:40 PM EDT  Workstation ID: [id number]      Assessment & Plan     Diagnoses and all orders for this visit:    1. GIST (gastrointestinal stromal tumor) of small bowel, malignant (Primary)  -     CBC & Differential; Future  -     Comprehensive Metabolic Panel; Future  -     CT chest w contrast; Future  -     CT abdomen pelvis w contrast; Future        Jacky Rodriguez is a 64 y.o. 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     -- EUS on 3/27/2024 at [facility] found no lesions but could not advance past the ligament of treitz  - CT A/P w/ contrast revealed a 2.8 cm hypervascular mural mass involving the proximal jejunum  - s/p robotic assisted resection of intra-abdominal mass with jejunal resection (4/26/2024) performed by Dr. Asa Ansari at [facility] on 4/29/2024  - Path (4/26/2024) indicated GIST   -Pt here to discuss systemic therapy treatment recommendations.    -discussed plan to re-scan with CT CAP which occurred in 6/2024 was negative for metastatic disease  -obtained [employer] NGS testing which revealed KIT mutation alone response to imatinib, regorafenib, sunitinib, TMB low at 3.2  -Case discussed with pathologist at [facility] Dr. Helm who shared mitotic rate was less than 5/HPF so low risk, pt had TEMPUS testing could administer imatinib if progresses,   -reviewed NCCN guidelines in detail with pt today, per NCCN guidelines for low risk GIST, tumor size 2.4 cm, can observe, with H&P and imaging every 3-6 months for up to 5 years  -pt declining  referral back to GI for EGD/c-scope  -10/30/24: discussed results of CT chest, abdomen/pelvis from 10/25/2024 which was negative for disease progression, discussed most recent CBC and CMP which were unremarkable  -pt without any B symptoms, blood loss or melena today.  -discussed plan to continue active surveillance    5/7/2025: Discussed results of CT chest, abdomen pelvis from 51/2025 which was negative for progression of disease, discussed results of most recent CBC, CMP, plan to re-scan pt in 6 months with repeat imaging    -Plan to have pt follow up with me with CT CAP with contrast in 6 months and with labs CBC, CMP    Please note that portions of this note were completed with a voice recognition program.    Electronically signed by Anders Zavala MD, 05/07/25, 4:31 PM EDT.    Follow Up     I spent 30 minutes caring for Jacky on this date of service. This time includes time spent by me in the following activities:preparing for the visit, reviewing tests, obtaining and/or reviewing a separately obtained history, performing a medically appropriate examination and/or evaluation , counseling and educating the patient/family/caregiver, ordering medications, tests, or procedures, referring and communicating with other health care professionals , documenting information in the medical record, independently interpreting results and communicating that information with the patient/family/caregiver, and care coordination.    This is an acute or chronic illness that poses a threat to life or bodily function. The above treatment plan involves a high risk of complications and/or mortality of patient management.    The patient was seen and examined. Work by the provider also included review and/or ordering of lab tests, review and/or ordering of radiology tests, review and/or ordering of medicine tests, discussion with other physicians or providers, independent review of data, obtaining old records, review/summation of old  records, and/or other review.    I have reviewed the family history, social history, and past medical history for this patient. Previous information and data has been reviewed and updated as needed. I have reviewed and verified the chief complaint, history, and other documentation. The patient was interviewed and examined in the clinic and the chart reviewed. The previous observations, recommendations, and conclusions were reviewed including those of other providers.     The plan was discussed with the patient and/or family. The patient was given time to ask questions and these questions were answered. At the conclusion of their visit they had no additional questions or concerns and all questions were answered to their satisfaction.    Patient was given instructions and counseling regarding his condition or for health maintenance advice. Please see specific information pulled into the AVS if appropriate.